# Patient Record
Sex: MALE | Race: ASIAN | NOT HISPANIC OR LATINO | Employment: FULL TIME | URBAN - METROPOLITAN AREA
[De-identification: names, ages, dates, MRNs, and addresses within clinical notes are randomized per-mention and may not be internally consistent; named-entity substitution may affect disease eponyms.]

---

## 2017-03-18 ENCOUNTER — SURGERY - HEALTHEAST (OUTPATIENT)
Dept: SURGERY | Facility: HOSPITAL | Age: 42
End: 2017-03-18

## 2017-03-18 ENCOUNTER — ANESTHESIA - HEALTHEAST (OUTPATIENT)
Dept: SURGERY | Facility: HOSPITAL | Age: 42
End: 2017-03-18

## 2017-03-18 ASSESSMENT — MIFFLIN-ST. JEOR
SCORE: 1407.43
SCORE: 1413.32

## 2017-03-27 ENCOUNTER — OFFICE VISIT (OUTPATIENT)
Dept: FAMILY MEDICINE | Facility: CLINIC | Age: 42
End: 2017-03-27

## 2017-03-27 VITALS
BODY MASS INDEX: 21.59 KG/M2 | TEMPERATURE: 97.9 F | DIASTOLIC BLOOD PRESSURE: 73 MMHG | WEIGHT: 129.6 LBS | OXYGEN SATURATION: 97 % | SYSTOLIC BLOOD PRESSURE: 113 MMHG | HEART RATE: 78 BPM | HEIGHT: 65 IN | RESPIRATION RATE: 16 BRPM

## 2017-03-27 DIAGNOSIS — L30.8 OTHER ECZEMA: ICD-10-CM

## 2017-03-27 DIAGNOSIS — R23.2 FACE GOES RED: Primary | ICD-10-CM

## 2017-03-27 DIAGNOSIS — K35.30 ACUTE APPENDICITIS WITH LOCALIZED PERITONITIS: ICD-10-CM

## 2017-03-27 LAB
% GRANULOCYTES: 66.4 %G (ref 40–75)
BUN SERPL-MCNC: 11 MG/DL (ref 5–24)
CALCIUM SERPL-MCNC: 9.1 MG/DL (ref 8.5–10.4)
CHLORIDE SERPLBLD-SCNC: 102 MMOL/L (ref 94–109)
CO2 SERPL-SCNC: 27 MMOL/L (ref 20–32)
CREAT SERPL-MCNC: 0.8 MG/DL (ref 0.8–1.5)
EGFR CALCULATED (BLACK REFERENCE): >90 ML/MIN
EGFR CALCULATED (NON BLACK REFERENCE): >90 ML/MIN
ERYTHROCYTE [SEDIMENTATION RATE] IN BLOOD: 27 MM/HR (ref 0–20)
GLUCOSE SERPL-MCNC: 163 MG/DL (ref 60–109)
GRANULOCYTES #: 5.5 K/UL (ref 1.6–8.3)
HCT VFR BLD AUTO: 48.5 % (ref 40–53)
HEMOGLOBIN: 15.5 G/DL (ref 13.3–17.7)
LYMPHOCYTES # BLD AUTO: 2.2 K/UL (ref 0.8–5.3)
LYMPHOCYTES NFR BLD AUTO: 26.2 %L (ref 20–48)
MCH RBC QN AUTO: 29.8 PG (ref 26.5–35)
MCHC RBC AUTO-ENTMCNC: 32 G/DL (ref 32–36)
MCV RBC AUTO: 93.2 FL (ref 78–100)
MID #: 0.6 K/UL (ref 0–2.2)
MID %: 7.4 %M (ref 0–20)
PLATELET # BLD AUTO: 252 K/UL (ref 150–450)
POTASSIUM SERPL-SCNC: 4 MMOL/L (ref 3.4–5.3)
RBC # BLD AUTO: 5.2 M/UL (ref 4.4–5.9)
SODIUM SERPL-SCNC: 140 MMOL/L (ref 133–144)
WBC # BLD AUTO: 8.3 K/UL (ref 4–11)

## 2017-03-27 RX ORDER — BENZOCAINE/MENTHOL 6 MG-10 MG
LOZENGE MUCOUS MEMBRANE
Qty: 30 G | Refills: 0 | Status: SHIPPED | OUTPATIENT
Start: 2017-03-27 | End: 2021-03-31

## 2017-03-27 NOTE — MR AVS SNAPSHOT
After Visit Summary   3/27/2017    Francisco Desir    MRN: [id number]           Patient Information     Date Of Birth          1975        Visit Information        Provider Department      3/27/2017 2:00 PM Bernabe Koch MD [facility]        Today's Diagnoses     Face goes red    -  1    Other eczema        Acute appendicitis with localized peritonitis          Care Instructions    Hydrocortisone cream - Apply once in the morning once at night.     Follow up in 2 weeks to recheck appendics and face.      Your medication list is printed, please keep this with you, it is helpful to bring this current list to any other medical appointments, the emergency room or hospital.    If you had lab testing today and your results are reassuring or normal they will be be mailed to you within 7 days.     If the lab tests need quick action we will call you with the results.   The phone number we will call with results is # [phone] (home) . If this is not the best number please call our clinic and change the number.    If you need any refills please call your pharmacy and they will contact us.    If you have any further concerns or wish to schedule another appointment you must call our office during normal business hours  [phone] (8-5:00 M-F)  If you have urgent medical questions that cannot wait  you may also call [phone] at any time of day.  If you have a medical emergency please call [phone].    Thank you for coming to [facility].          Follow-ups after your visit        Who to contact     Please call your clinic at [phone] to:    Ask questions about your health    Make or cancel appointments    Discuss your medicines    Learn about your test results    Speak to your doctor   If you have compliments or concerns about an experience at your clinic, or if you wish to file a complaint, please contact [facility] Patient Relations at [phone] or  "email us at [email or web address]         Additional Information About Your Visit        [employer] Information     [employer] is an electronic gateway that provides easy, online access to your medical records. With [employer], you can request a clinic appointment, read your test results, renew a prescription or communicate with your care team.     To sign up for [employer] visit the website at [email or web address]   You will be asked to enter the access code listed below, as well as some personal information. Please follow the directions to create your username and password.     Your access code is: [email or web address]  Expires: 2017  2:18 PM     Your access code will  in 90 days. If you need help or a new code, please contact your [facility] or call [phone] for assistance.        Care EveryWhere ID     This is your Care EveryWhere ID. This could be used by other organizations to access your [facility] medical records  [id number]        Your Vitals Were     Pulse Temperature Respirations Height Pulse Oximetry BMI (Body Mass Index)    78 97.9  F (36.6  C) (Oral) 16 5' 4.5\" (163.8 cm) 97% 21.9 kg/m2       Blood Pressure from Last 3 Encounters:   17 113/73    Weight from Last 3 Encounters:   17 129 lb 9.6 oz (58.8 kg)              We Performed the Following     Basic Metabolic Panel ([facility])  - Results < 1 hr     CBC w/ Diff and Plt ([facility])     Erythrocyte Sedimentation Rate ([facility])          Today's Medication Changes          These changes are accurate as of: 3/27/17  2:18 PM.  If you have any questions, ask your nurse or doctor.               Start taking these medicines.        Dose/Directions    hydrocortisone 1 % cream   Commonly known as:  CORTAID   Used for:  Other eczema   Started by:  Bernabe Koch MD        Apply sparingly to affected area twice per day as needed   Quantity:  30 g   Refills:  0            Where to get your medicines    "   These medications were sent to [facility] - [place], [place] - [address]  [address], [place] [address]     Phone:  [phone]     hydrocortisone 1 % cream                Primary Care Provider Office Phone # Fax #    Bernabe Koch [phone]       [facility] [address] [place] [address]        Thank you!     Thank you for choosing [facility]  for your care. Our goal is always to provide you with excellent care. Hearing back from our patients is one way we can continue to improve our services. Please take a few minutes to complete the written survey that you may receive in the mail after your visit with us. Thank you!             Your Updated Medication List - Protect others around you: Learn how to safely use, store and throw away your medicines at www.disposemymeds.org.          This list is accurate as of: 3/27/17  2:18 PM.  Always use your most recent med list.                   Brand Name Dispense Instructions for use    hydrocortisone 1 % cream    CORTAID    30 g    Apply sparingly to affected area twice per day as needed

## 2017-03-27 NOTE — PROGRESS NOTES
"       HPI:       Francisco Desir is a 41 year old  male new to our clinic who presents for  Follow up of appendicitis and new complaint of left cheek inflammation that occurs intermittently.    1. Patient released from hospital 7 days ago following lap appendectomy for appendicitis.  Has been doing well. Eating, no SOB.  Little further pain, and he does not require additional pain medication. Scheduled to see surgeon next week. Does not have a family physician.    2. Patient states that sa few years ago he had sunburn of his left cheek when fishing. Now get intermittent redness of the check that may be getting worse or large according to patient. No fever, no chills. This may be related to a similar trouble he has in the labial folds, and the scalp.               PMHX:   Current Medications:   No current outpatient prescriptions on file.       Existing Problems  There is no problem list on file for this patient.      Allergies:  No Known Allergies    Previous labs:  No results found for: WBC, HGB, HCT, PLT, CHOL, TRIG, HDL, ALT, AST, NA, CREATININE, BUN, CO2, TSH, PSA, INR, GLUF            Review of Systems:    CONSTITUTIONAL: no fatigue, no unexpected change in weight  SKIN:  no worrisome moles  EYES: no acute vision problems or changes  ENT: no ear problems, no mouth problems, no throat problems  RESP: no significant cough, no shortness of breath  CV: no chest pain, no palpitations, no new or worsening peripheral edema  GI: no nausea, no vomiting, no constipation, no diarrhea          Physical Exam:     Vitals:    03/27/17 1353   BP: 113/73   Pulse: 78   Resp: 16   Temp: 97.9  F (36.6  C)   TempSrc: Oral   SpO2: 97%   Weight: 129 lb 9.6 oz (58.8 kg)   Height: 5' 4.5\" (163.8 cm)     Body mass index is 21.9 kg/(m^2).    GENERAL:alert, well hydrated, no distress  EYES: Eyes grossly normal to inspection, extraocular movements - intact, and PERRL  HENT: ear canals- normal; TMs- normal; Nose- normal; Mouth- no ulcers, no " lesions Cheek: left cheek with mild erythema. Patient has been using Hmong traditional medicine on it, so difficult to distinguish this as a normal reaction or not.    NECK: no tenderness, no adenopathy, no asymmetry, no masses, no stiffness; thyroid- normal to palpation  RESP: lungs clear to auscultation - no rales, no rhonchi, no wheezes  CV: regular rates and rhythm, normal S1 S2, no S3 or S4 and no murmur, no click or rub -  ABDOMEN: soft, no tenderness, no  hepatosplenomegaly, recent lap appendectomy incisions.  No erythema.  No drainage.  no masses, normal bowel sounds             Labs and Procedures     No results found for any previous visit.           Assessment and Plan     1. S/p Acute appendicitis - released from hospital one week ago. Due to see surgeon next week. Has been doing well following surgery.  No further abdominal pain.  Wounds healing well without inflammation or erythema.   2.  Mild eczema on left cheek following old injury.  Persistent sensitivity to cold.  Sometimes it breaks out in rash according to patient.  Will initially check for metabolic reasons for flushing cheeks. For symptomatic support, will provide mild hydrocortisone.  Recheck in two weeks.     Options for treatment and follow-up care were reviewed with the patient and/or guardian. Francisco Desir and/or guardian engaged in the decision making process and verbalized understanding of the options discussed and agreed with the final plan.    Bernabe Koch MD

## 2017-03-27 NOTE — PATIENT INSTRUCTIONS
Hydrocortisone cream - Apply once in the morning once at night.     Follow up in 2 weeks to recheck appendics and face.      Your medication list is printed, please keep this with you, it is helpful to bring this current list to any other medical appointments, the emergency room or hospital.    If you had lab testing today and your results are reassuring or normal they will be be mailed to you within 7 days.     If the lab tests need quick action we will call you with the results.   The phone number we will call with results is # [phone] (home) . If this is not the best number please call our clinic and change the number.    If you need any refills please call your pharmacy and they will contact us.    If you have any further concerns or wish to schedule another appointment you must call our office during normal business hours  [phone] (8-5:00 M-F)  If you have urgent medical questions that cannot wait  you may also call [phone] at any time of day.  If you have a medical emergency please call [phone].    Thank you for coming to [facility].

## 2017-03-27 NOTE — NURSING NOTE
DUE FOR:  Lipid?  Mychart?  Tdap?    ROBBIN signed for [place]      name: Blanche Will  Language: Hmong  Agency: [employer]  Phone number: [phone]

## 2017-03-30 NOTE — PROGRESS NOTES
Please call patient and send results letter  The blood tests suggest that the appendicitis is doing well, however the blood sugar is higher than expected. This could be a reaction to the appendicitis, but would recommend scheduling another clinic visit for a recheck, and possible test to ensure that blood sugar is not causing a problem.

## 2017-04-03 ENCOUNTER — OFFICE VISIT - HEALTHEAST (OUTPATIENT)
Dept: SURGERY | Facility: CLINIC | Age: 42
End: 2017-04-03

## 2017-04-03 ENCOUNTER — COMMUNICATION - HEALTHEAST (OUTPATIENT)
Dept: SURGERY | Facility: CLINIC | Age: 42
End: 2017-04-03

## 2017-04-03 ENCOUNTER — OFFICE VISIT (OUTPATIENT)
Dept: FAMILY MEDICINE | Facility: CLINIC | Age: 42
End: 2017-04-03

## 2017-04-03 VITALS
DIASTOLIC BLOOD PRESSURE: 74 MMHG | RESPIRATION RATE: 18 BRPM | HEIGHT: 64 IN | BODY MASS INDEX: 22.57 KG/M2 | WEIGHT: 132.2 LBS | HEART RATE: 72 BPM | OXYGEN SATURATION: 100 % | TEMPERATURE: 98.2 F | SYSTOLIC BLOOD PRESSURE: 128 MMHG

## 2017-04-03 DIAGNOSIS — Z98.890 POST-OPERATIVE STATE: ICD-10-CM

## 2017-04-03 DIAGNOSIS — R23.2 FACIAL FLUSHING: Primary | ICD-10-CM

## 2017-04-03 DIAGNOSIS — R73.9 HYPERGLYCEMIA: ICD-10-CM

## 2017-04-03 LAB
CRP SERPL-MCNC: 0.2 MG/DL (ref 0–0.8)
HBA1C MFR BLD: 5.8 % (ref 4.1–5.7)
RHEUMATOID FACT SERPL-ACNC: <15 IU/ML (ref 0–30)

## 2017-04-03 NOTE — PROGRESS NOTES
"       HPI:       Francisco Desir is a 41 year old  [facility] male who presents for recheck of blood sugar.   Patient recently underwent acute appendectomy without complication.  Saw me for follow up.  Also had an initial complaint of facial flushing, mostly on the left.  This was the site of a previous sunburn obtained when fishing about two years ago.  Pt notices that it has been flushing, and is interested to know why.  Initial lab evaluation demonstrated elevated blood glucose to 160s.  Have asked patient back for evaluation and to check A1c.    No history of athropathy, or change in the urine.   Saw surgeon earlier today, who said he was doing well, but not able to go back to work yet.             PMHX:   Current Medications:   Current Outpatient Prescriptions   Medication Sig Dispense Refill     hydrocortisone (CORTAID) 1 % cream Apply sparingly to affected area twice per day as needed 30 g 0       Existing Problems  There is no problem list on file for this patient.      Allergies:  No Known Allergies    Previous labs:  Lab Results   Component Value Date    HGB 15.5 03/27/2017    HCT 48.5 03/27/2017    .0 03/27/2017    BUN 11.0 03/27/2017    CO2 27.0 03/27/2017               Review of Systems:    CONSTITUTIONAL: no fatigue, no unexpected change in weight  SKIN: no worrisome rashes, no worrisome moles, no worrisome lesions  EYES: no acute vision problems or changes  ENT: no ear problems, no mouth problems, no throat problems  RESP: no significant cough, no shortness of breath  CV: no chest pain, no palpitations, no new or worsening peripheral edema  GI: no nausea, no vomiting, no constipation, no diarrhea          Physical Exam:     Vitals:    04/03/17 1541   BP: 128/74   Pulse: 72   Resp: 18   Temp: 98.2  F (36.8  C)   TempSrc: Oral   SpO2: 100%   Weight: 132 lb 3.2 oz (60 kg)   Height: 5' 4.25\" (163.2 cm)     Body mass index is 22.52 kg/(m^2).    GENERAL:alert, well hydrated, no distress  EYES: Eyes grossly " normal to inspection, extraocular movements - intact, and PERRL  HENT:  Nose- normal; Mouth- no ulcers, no lesions. Possible mild erythema of the malar region of the left cheek.    NECK: no tenderness, no adenopathy, no asymmetry, no masses, no stiffness;   RESP: lungs clear to auscultation - no rales, no rhonchi, no wheezes  CV: regular rates and rhythm, normal S1 S2, no S3 or S4 and no murmur, no click or rub -               Labs and Procedures     Office Visit on 03/27/2017   Component Date Value Ref Range Status     Sed Rate 03/27/2017 27* 0 - 20 mm/hr Final     Glucose 03/27/2017 163.0* 60.0 - 109.0 mg/dL Final     Urea Nitrogen 03/27/2017 11.0  5.0 - 24.0 mg/dL Final     Creatinine 03/27/2017 0.8  0.8 - 1.5 mg/dL Final     Sodium 03/27/2017 140.0  133.0 - 144.0 mmol/L Final     Potassium 03/27/2017 4.0  3.4 - 5.3 mmol/L Final     Chloride 03/27/2017 102.0  94.0 - 109.0 mmol/L Final     Carbon Dioxide 03/27/2017 27.0  20.0 - 32.0 mmol/L Final     Calcium 03/27/2017 9.1  8.5 - 10.4 mg/dL Final     eGFR Calculated (Non Black Referen* 03/27/2017 >90  >60.0 mL/min Final     eGFR Calculated (Black Reference) 03/27/2017 >90  >60.0 mL/min Final     WBC 03/27/2017 8.3  4.0 - 11.0 K/uL Final     Lymphocytes # 03/27/2017 2.2  0.8 - 5.3 K/uL Final     % Lymphocytes 03/27/2017 26.2  20.0 - 48.0 %L Final     Mid # 03/27/2017 0.6  0.0 - 2.2 K/uL Final     Mid % 03/27/2017 7.4  0.0 - 20.0 %M Final     GRANULOCYTES # 03/27/2017 5.5  1.6 - 8.3 K/uL Final     % Granulocytes 03/27/2017 66.4  40.0 - 75.0 %G Final     RBC 03/27/2017 5.20  4.40 - 5.90 M/uL Final     Hemoglobin 03/27/2017 15.5  13.3 - 17.7 g/dL Final     Hematocrit 03/27/2017 48.5  40.0 - 53.0 % Final     MCV 03/27/2017 93.2  78.0 - 100.0 fL Final     MCH 03/27/2017 29.8  26.5 - 35.0 pg Final     MCHC 03/27/2017 32.0  32.0 - 36.0 g/dL Final     Platelets 03/27/2017 252.0  150.0 - 450.0 K/uL Final              Assessment and Plan     1.Will check A1c in view of  hyperglycemia.    2. Mildly elevated sed rate, will check RON, CRP, and RF.  Recheck inone week.       Options for treatment and follow-up care were reviewed with the patient and/or guardian. Francisco Desir and/or guardian engaged in the decision making process and verbalized understanding of the options discussed and agreed with the final plan.    Bernabe Koch MD

## 2017-04-03 NOTE — MR AVS SNAPSHOT
After Visit Summary   4/3/2017    Francisco Desir    MRN: [id number]           Patient Information     Date Of Birth          1975        Visit Information        Provider Department      4/3/2017 3:20 PM Bernabe Koch MD [facility]        Today's Diagnoses     Facial flushing    -  1    Hyperglycemia          Care Instructions    ~Keep appt on 4/10/17, we will see you for follow up then    Your medication list is printed, please keep this with you, it is helpful to bring this current list to any other medical appointments, the emergency room or hospital.    If you had lab testing today and your results are reassuring or normal they will be be mailed to you within 7 days.     If the lab tests need quick action we will call you with the results.   The phone number we will call with results is # [phone] (home) . If this is not the best number please call our clinic and change the number.    If you need any refills please call your pharmacy and they will contact us.    If you have any further concerns or wish to schedule another appointment you must call our office during normal business hours  [phone] (8-5:00 M-F)  If you have urgent medical questions that cannot wait  you may also call [phone] at any time of day.  If you have a medical emergency please call [phone].    Thank you for coming to [facility].          Follow-ups after your visit        Your next 10 appointments already scheduled     Apr 10, 2017  3:00 PM CDT   Return Visit with Bernabe Koch MD   [facility] ([facility])    [address] [place] [id number]   [phone]              Who to contact     Please call your clinic at [phone] to:    Ask questions about your health    Make or cancel appointments    Discuss your medicines    Learn about your test results    Speak to your doctor   If you have compliments or concerns about an experience at your clinic, or if  "you wish to file a complaint, please contact [facility] Patient Relations at [phone] or email us at [email or web address]         Additional Information About Your Visit        [employer] Information     [employer] is an electronic gateway that provides easy, online access to your medical records. With [employer], you can request a clinic appointment, read your test results, renew a prescription or communicate with your care team.     To sign up for [employer] visit the website at [email or web address]   You will be asked to enter the access code listed below, as well as some personal information. Please follow the directions to create your username and password.     Your access code is: [email or web address]  Expires: 2017  2:18 PM     Your access code will  in 90 days. If you need help or a new code, please contact your [facility] or call [phone] for assistance.        Care EveryWhere ID     This is your Care EveryWhere ID. This could be used by other organizations to access your [place] medical records  [id number]        Your Vitals Were     Pulse Temperature Respirations Height Pulse Oximetry BMI (Body Mass Index)    72 98.2  F (36.8  C) (Oral) 18 5' 4.25\" (163.2 cm) 100% 22.52 kg/m2       Blood Pressure from Last 3 Encounters:   17 128/74   17 113/73    Weight from Last 3 Encounters:   17 132 lb 3.2 oz (60 kg)   17 129 lb 9.6 oz (58.8 kg)              We Performed the Following     Antinuclear Ab [place] ([employer])     C-Reactive Protein ([facility])     Hemoglobin A1c ([facility])     Rheumatoid Factor Quant ([facility])        Primary Care Provider Office Phone # Fax #    Bernabe Koch [phone]       [facility] [address] [place] [address]        Thank you!     Thank you for choosing [facility]  for your care. Our goal is always to provide you with " excellent care. Hearing back from our patients is one way we can continue to improve our services. Please take a few minutes to complete the written survey that you may receive in the mail after your visit with us. Thank you!             Your Updated Medication List - Protect others around you: Learn how to safely use, store and throw away your medicines at www.disposemymeds.org.          This list is accurate as of: 4/3/17  3:56 PM.  Always use your most recent med list.                   Brand Name Dispense Instructions for use    hydrocortisone 1 % cream    CORTAID    30 g    Apply sparingly to affected area twice per day as needed

## 2017-04-03 NOTE — PATIENT INSTRUCTIONS
~Keep appt on 4/10/17, we will see you for follow up then    Your medication list is printed, please keep this with you, it is helpful to bring this current list to any other medical appointments, the emergency room or hospital.    If you had lab testing today and your results are reassuring or normal they will be be mailed to you within 7 days.     If the lab tests need quick action we will call you with the results.   The phone number we will call with results is # [phone] (home) . If this is not the best number please call our clinic and change the number.    If you need any refills please call your pharmacy and they will contact us.    If you have any further concerns or wish to schedule another appointment you must call our office during normal business hours  [phone] (8-5:00 M-F)  If you have urgent medical questions that cannot wait  you may also call [phone] at any time of day.  If you have a medical emergency please call [phone].    Thank you for coming to [facility].

## 2017-04-04 LAB — ANA SER QL: 0.5 U

## 2017-04-05 LAB
PHOSPHOLIPID AB IGG, S: <9.4 GPL
PHOSPHOLIPID AB IGM, S: <9.4 MPL

## 2017-04-06 ENCOUNTER — COMMUNICATION - HEALTHEAST (OUTPATIENT)
Dept: SURGERY | Facility: CLINIC | Age: 42
End: 2017-04-06

## 2017-04-10 ENCOUNTER — OFFICE VISIT (OUTPATIENT)
Dept: FAMILY MEDICINE | Facility: CLINIC | Age: 42
End: 2017-04-10

## 2017-04-10 VITALS
TEMPERATURE: 97.8 F | BODY MASS INDEX: 23.18 KG/M2 | DIASTOLIC BLOOD PRESSURE: 68 MMHG | WEIGHT: 135.8 LBS | HEIGHT: 64 IN | HEART RATE: 76 BPM | SYSTOLIC BLOOD PRESSURE: 107 MMHG | OXYGEN SATURATION: 97 % | RESPIRATION RATE: 18 BRPM

## 2017-04-10 DIAGNOSIS — H10.45 CHRONIC ALLERGIC CONJUNCTIVITIS: ICD-10-CM

## 2017-04-10 DIAGNOSIS — L53.9 ERYTHEMA OF FACE: Primary | ICD-10-CM

## 2017-04-10 DIAGNOSIS — R23.2 FACIAL FLUSHING: ICD-10-CM

## 2017-04-10 DIAGNOSIS — Z23 IMMUNIZATION DUE: ICD-10-CM

## 2017-04-10 RX ORDER — AMOXICILLIN 250 MG
1 CAPSULE ORAL
COMMUNITY
Start: 2017-03-19 | End: 2021-08-30

## 2017-04-10 NOTE — PROGRESS NOTES
"       HPI:       Francisco Desir is a 41 year old  male who presents for Recheck of facial flushing and eye irritation.   Patient states that the 1% hydrocortisone tried on the face burned and made things worse. Discussed evaluation of possible metabolic causes of flucing.  Mildly elevated Sed rate,   RON, CRP, Phospholipid Ab, and RA factor all negative.  No clear evidence of metabolic etiology.    Mild erythema of the left cheek - slightly worse than last week.               PMHX:   Current Medications:   Current Outpatient Prescriptions   Medication Sig Dispense Refill     senna-docusate (SENOKOT-S;PERICOLACE) 8.6-50 MG per tablet Take 1 tablet by mouth       hydrocortisone (CORTAID) 1 % cream Apply sparingly to affected area twice per day as needed (Patient not taking: Reported on 4/10/2017) 30 g 0       Existing Problems  There is no problem list on file for this patient.      Allergies:  No Known Allergies    Previous labs:  Lab Results   Component Value Date    HGB 15.5 03/27/2017    HCT 48.5 03/27/2017    .0 03/27/2017    BUN 11.0 03/27/2017    CO2 27.0 03/27/2017               Review of Systems:    CONSTITUTIONAL: no fatigue, no unexpected change in weight  SKIN: no worrisome rashes, no worrisome moles, no worrisome lesions  EYES: no acute vision problems or changes  ENT: no ear problems, no mouth problems, no throat problems  RESP: no significant cough, no shortness of breath  CV: no chest pain, no palpitations, no new or worsening peripheral edema  GI: no nausea, no vomiting, no constipation, no diarrhea          Physical Exam:     Vitals:    04/10/17 1452   BP: 107/68   Pulse: 76   Resp: 18   Temp: 97.8  F (36.6  C)   TempSrc: Oral   SpO2: 97%   Weight: 135 lb 12.8 oz (61.6 kg)   Height: 5' 4.25\" (163.2 cm)     Body mass index is 23.13 kg/(m^2).    GENERAL:alert, well hydrated, no distress  EYES: Eyes grossly normal to inspection, extraocular movements - intact, and PERRL. Pterygium forming on left " sclera. Mild bilateral irritation and injectioen of conjunctiva bilaterally, left worse than the right.     HENT: ear canals- normal; TMs- normal; Nose- normal; Mouth- no ulcers, no lesions  NECK: no tenderness, no adenopathy, no asymmetry, no masses, no stiffness; thyroid- normal to palpation  RESP: lungs clear to auscultation - no rales, no rhonchi, no wheezes  CV: regular rates and rhythm, normal S1 S2, no S3 or S4 and no murmur, no click or rub.             Labs and Procedures     Office Visit on 04/03/2017   Component Date Value Ref Range Status     Hemoglobin A1C 04/03/2017 5.8* 4.1 - 5.7 % Final     C-Reactive Protein 04/03/2017 0.2  0.0 - 0.8 mg/dL Final     RON Screen [place] 04/03/2017 0.5  <=2.9 U Final     RA,Quantitative 04/03/2017 <15.0  0 - 30 IU/mL Final     Phospholipid Ab IgM, S 04/03/2017 <9.4  <15.0 (Negative) MPL Final     Phospholipid Ab IgG, S 04/03/2017 <9.4  <15.0 (Negative) GPL Final    Comment:    Test Performed by:  [facility] [place] [address]                Assessment and Plan     1.Alergic conjunctivitis - with mild to moderate scleral irritation.  Will initially treat with claritin.  Advance to opthalmologic eye drops if necessary.    2. Left malar erythema t sight of old injury.  Unclear etiology.  Will refer to dermatology for evaluation and treatment.    3. Return in two to three weeks.         Options for treatment and follow-up care were reviewed with the patient and/or guardian. Francisco Desir and/or guardian engaged in the decision making process and verbalized understanding of the options discussed and agreed with the final plan.    Bernabe Koch MD

## 2017-04-10 NOTE — PATIENT INSTRUCTIONS
- Start taking loratadine-pseudoePHEDrine (Claritin D-24 Hour)  mg daily.   - You can purchase this medication over the counter. This medication may not be covered by insurance.  - Take medication in the morning as it may keep you awake if you take at night.    Your medication list is printed, please keep this with you, it is helpful to bring this current list to any other medical appointments, the emergency room or hospital.    If you had lab testing today and your results are reassuring or normal they will be be mailed to you within 7 days.     If the lab tests need quick action we will call you with the results.   The phone number we will call with results is # [phone] (home) . If this is not the best number please call our clinic and change the number.    If you need any refills please call your pharmacy and they will contact us.    If you have any further concerns or wish to schedule another appointment you must call our office during normal business hours  [phone] (8-5:00 M-F)  If you have urgent medical questions that cannot wait  you may also call [phone] at any time of day.  If you have a medical emergency please call [phone].    Thank you for coming to [facility].    Erythema  Erythema means a reddening of the skin. If the condition is just in one area of your body, it can mean that you have inflammation, irritation, or infection of the skin. Erythema over a joint can be a sign of joint infection. When erythema is spread over most of your body, like a rash, it is usually a sign of a more general problem. This could be an allergic reaction, viral or bacterial infection, or an immune system disease.  The cause of your condition is not clear. It may be hard to diagnose the exact cause of an illness in its early stages. More time may be needed before doctors can make a diagnosis.  Home care  Follow these guidelines when caring for yourself at home:    Watch for any new symptoms.  Tell your health care provider about any that show up.    You may use acetaminophen or ibuprofen to control pain, unless another medicine was prescribed. If you have chronic liver or kidney disease, talk with your provider before using these medicines. Also talk with your provider if you ve had a stomach ulcer or GI bleeding. Don t give aspirin to anyone under 18 years of age who is ill with a fever.    Have anyone who touches your skin wash his or her hands with soap and water.    Don t share towels or clothes.    Keep the affected area clean and dry. Raising the affected area above the level of your heart may help ease swelling.  Follow-up care  Follow up with your health care provider, or as advised.  When to seek medical advice  Call your health care provider right away  if any of these occur:    Pain or redness that gets worse    Fluid or pus drains from the reddened area    New joint pain    New rash    Fever of 100.4 F (38 C) or higher, or as directed by your health care provider    Severe headache, neck pain, drowsiness, or confusion    Weakness, dizziness, repeated vomiting, or diarrhea     0133-0115 [employer]. [address] [place] [address]. All rights reserved. This information is not intended as a substitute for professional medical care. Always follow your healthcare professional's instructions.      Referral for ( TEST )  :      Dermatology  LOCATION/PLACE/Provider :    [facility]  [place] [address]  DATE & TIME :  4-12-17 at 10:20     PHONE :     [phone]  FAX :     [phone]  ADDITIONAL INFORMATION :     NA  Appointment made by clinic staff/:    [facility]

## 2017-04-10 NOTE — NURSING NOTE
Tdap-given     name: Blanche Will  Language: Hmong  Agency: [facility]  Phone number: [phone]

## 2017-04-10 NOTE — MR AVS SNAPSHOT
After Visit Summary   4/10/2017    Francisco Desir    MRN: [id number]           Patient Information     Date Of Birth          1975        Visit Information        Provider Department      4/10/2017 3:00 PM Bernabe Koch MD [facility]        Today's Diagnoses     Immunization due    -  1    Facial flushing        Erythema of face        Chronic allergic conjunctivitis          Care Instructions    - Start taking loratadine-pseudoePHEDrine (Claritin D-24 Hour)  mg daily.   - You can purchase this medication over the counter. This medication may not be covered by insurance.  - Take medication in the morning as it may keep you awake if you take at night.    Your medication list is printed, please keep this with you, it is helpful to bring this current list to any other medical appointments, the emergency room or hospital.    If you had lab testing today and your results are reassuring or normal they will be be mailed to you within 7 days.     If the lab tests need quick action we will call you with the results.   The phone number we will call with results is # [phone] (home) . If this is not the best number please call our clinic and change the number.    If you need any refills please call your pharmacy and they will contact us.    If you have any further concerns or wish to schedule another appointment you must call our office during normal business hours  [phone] (8-5:00 M-F)  If you have urgent medical questions that cannot wait  you may also call [phone] at any time of day.  If you have a medical emergency please call [phone].    Thank you for coming to [facility].    Erythema  Erythema means a reddening of the skin. If the condition is just in one area of your body, it can mean that you have inflammation, irritation, or infection of the skin. Erythema over a joint can be a sign of joint infection. When erythema is spread over most of your body, like a  rash, it is usually a sign of a more general problem. This could be an allergic reaction, viral or bacterial infection, or an immune system disease.  The cause of your condition is not clear. It may be hard to diagnose the exact cause of an illness in its early stages. More time may be needed before doctors can make a diagnosis.  Home care  Follow these guidelines when caring for yourself at home:    Watch for any new symptoms. Tell your health care provider about any that show up.    You may use acetaminophen or ibuprofen to control pain, unless another medicine was prescribed. If you have chronic liver or kidney disease, talk with your provider before using these medicines. Also talk with your provider if you ve had a stomach ulcer or GI bleeding. Don t give aspirin to anyone under 18 years of age who is ill with a fever.    Have anyone who touches your skin wash his or her hands with soap and water.    Don t share towels or clothes.    Keep the affected area clean and dry. Raising the affected area above the level of your heart may help ease swelling.  Follow-up care  Follow up with your health care provider, or as advised.  When to seek medical advice  Call your health care provider right away  if any of these occur:    Pain or redness that gets worse    Fluid or pus drains from the reddened area    New joint pain    New rash    Fever of 100.4 F (38 C) or higher, or as directed by your health care provider    Severe headache, neck pain, drowsiness, or confusion    Weakness, dizziness, repeated vomiting, or diarrhea     0439-8885 [employer]. [address] [place] [address], [place] [address]. All rights reserved. This information is not intended as a substitute for professional medical care. Always follow your healthcare professional's instructions.              Follow-ups after your visit        Additional Services     DERMATOLOGY REFERRAL       Reason for Referral: erythema of face, left  "cheek.     needed: Yes  Language: Hmong    May leave message on voicemail: Yes    ([facility] Only) Referral should be tracked (Yes/No)?                  Future tests that were ordered for you today     Open Future Orders        Priority Expected Expires Ordered    DERMATOLOGY REFERRAL Routine  4/10/2018 4/10/2017            Who to contact     Please call your clinic at [phone] to:    Ask questions about your health    Make or cancel appointments    Discuss your medicines    Learn about your test results    Speak to your doctor   If you have compliments or concerns about an experience at your clinic, or if you wish to file a complaint, please contact [facility] Patient Relations at [phone] or email us at [email or web address]         Additional Information About Your Visit        CareTreeharKuratur Information     Yovigo is an electronic gateway that provides easy, online access to your medical records. With Yovigo, you can request a clinic appointment, read your test results, renew a prescription or communicate with your care team.     To sign up for Yovigo visit the website at [email or web address]   You will be asked to enter the access code listed below, as well as some personal information. Please follow the directions to create your username and password.     Your access code is: [email or web address]  Expires: 2017  2:18 PM     Your access code will  in 90 days. If you need help or a new code, please contact your [facility] or call [phone] for assistance.        Care EveryWhere ID     This is your Care EveryWhere ID. This could be used by other organizations to access your [place] medical records  [id number]        Your Vitals Were     Pulse Temperature Respirations Height Pulse Oximetry BMI (Body Mass Index)    76 97.8  F (36.6  C) (Oral) 18 5' 4.25\" (163.2 cm) 97% 23.13 kg/m2       Blood Pressure from Last 3 " Encounters:   04/10/17 107/68   04/03/17 128/74   03/27/17 113/73    Weight from Last 3 Encounters:   04/10/17 135 lb 12.8 oz (61.6 kg)   04/03/17 132 lb 3.2 oz (60 kg)   03/27/17 129 lb 9.6 oz (58.8 kg)              We Performed the Following     ADMIN VACCINE, INITIAL     TDAP VACCINE (BOOSTRIX)          Today's Medication Changes          These changes are accurate as of: 4/10/17  3:29 PM.  If you have any questions, ask your nurse or doctor.               Start taking these medicines.        Dose/Directions    loratadine-pseudoePHEDrine  MG per 24 hr tablet   Commonly known as:  CLARITIN-D 24-hour   Used for:  Chronic allergic conjunctivitis   Started by:  Bernabe Koch MD        Dose:  1 tablet   Take 1 tablet by mouth daily   Quantity:  14 tablet   Refills:  0            Where to get your medicines      Some of these will need a paper prescription and others can be bought over the counter.  Ask your nurse if you have questions.     Bring a paper prescription for each of these medications     loratadine-pseudoePHEDrine  MG per 24 hr tablet                Primary Care Provider Office Phone # Fax #    Bernabe Koch [phone]       [facility] [address] [place] [address]        Thank you!     Thank you for choosing [facility]  for your care. Our goal is always to provide you with excellent care. Hearing back from our patients is one way we can continue to improve our services. Please take a few minutes to complete the written survey that you may receive in the mail after your visit with us. Thank you!             Your Updated Medication List - Protect others around you: Learn how to safely use, store and throw away your medicines at www.disposemymeds.org.          This list is accurate as of: 4/10/17  3:29 PM.  Always use your most recent med list.                   Brand Name Dispense Instructions for use    hydrocortisone 1 % cream     CORTAID    30 g    Apply sparingly to affected area twice per day as needed       loratadine-pseudoePHEDrine  MG per 24 hr tablet    CLARITIN-D 24-hour    14 tablet    Take 1 tablet by mouth daily       senna-docusate 8.6-50 MG per tablet    SENOKOT-S;PERICOLACE     Take 1 tablet by mouth

## 2017-05-03 ENCOUNTER — OFFICE VISIT (OUTPATIENT)
Dept: FAMILY MEDICINE | Facility: CLINIC | Age: 42
End: 2017-05-03

## 2017-05-03 VITALS
BODY MASS INDEX: 22.23 KG/M2 | DIASTOLIC BLOOD PRESSURE: 80 MMHG | HEART RATE: 78 BPM | WEIGHT: 133.4 LBS | HEIGHT: 65 IN | TEMPERATURE: 97.9 F | SYSTOLIC BLOOD PRESSURE: 127 MMHG | OXYGEN SATURATION: 96 %

## 2017-05-03 DIAGNOSIS — J06.9 VIRAL URI: ICD-10-CM

## 2017-05-03 DIAGNOSIS — R05.9 COUGH: Primary | ICD-10-CM

## 2017-05-03 LAB
FLUAV AG UPPER RESP QL IA.RAPID: NEGATIVE
FLUBV AG UPPER RESP QL IA.RAPID: NEGATIVE

## 2017-05-03 RX ORDER — GUAIFENESIN/DEXTROMETHORPHAN 100-10MG/5
5 SYRUP ORAL 3 TIMES DAILY PRN
Qty: 560 ML | Refills: 0 | Status: SHIPPED | OUTPATIENT
Start: 2017-05-03 | End: 2017-05-18

## 2017-05-03 NOTE — PROGRESS NOTES
"       HPI:       Francisco Desir is a 42 year old male presenting for:    1. Cough, sore throat  - started on evening of Monday Monday 5/1   - cough:   - non-productive   - mildly worse at night, present intermittently through the day  - endorsing chest congestion , clear rhinorrhea, and mild myalgias   - no fevers, chills, night sweats  - no shortness of breath  - no watery eyes   - no n/v/d  - no sick contacts at home  - normally gest the flu shot every year, however no flu shot this year  - cough is worse at night, but still present through the day as well  - no smoking, no asthma, no copd         Physical Exam:     Vitals:    05/03/17 1608   BP: 127/80   BP Location: Right arm   Patient Position: Chair   Cuff Size: Adult Regular   Pulse: 78   Temp: 97.9  F (36.6  C)   TempSrc: Oral   SpO2: 96%   Weight: 133 lb 6.4 oz (60.5 kg)   Height: 5' 4.75\" (164.5 cm)     Body mass index is 22.37 kg/(m^2).    Vitals reviewed  General: No acute distress  Ears: canals patent, TM within normal limits  Eyes: EOMI, PERRLA  Nose: nasal mucosa moist, clear rhinorrhea  Oral cavity: moist mucosa, no tonsillar exudates, no oropharyngeal erythema/swelling  Neck: good ROM, supple, no apparent tracheal deviation  Respiratory: CTA bilaterally, no wheezes/rhonchi/rhales appreciated, no respiratory distress  Chest wall: No chest wall tenderness  Abdomen: soft, non-distended, non-tender, normoactive bowel sounds  Extremities: no cyanosis, no edema, capillary refill <2 seconds, well perfused      Assessment and Plan   #VIral URI  - influenza A/B negative  - tylenol/ibuprofen PRN pain/fever (no history of bleeding disorder and/or GI bleeding/ulcer per patient)  - robitussin PRN  - if symptoms do not improve in 1 week, return to clinic     Options for treatment and follow-up care were reviewed with the patient and/or guardian. Francisco Desir and/or guardian engaged in the decision making process and verbalized understanding of the options " discussed and agreed with the final plan.    Misbah Palla, MD      Precepted today with: Ana Galo MD

## 2017-05-03 NOTE — MR AVS SNAPSHOT
"              After Visit Summary   5/3/2017    Francisco Desir    MRN: [id number]           Patient Information     Date Of Birth          1975        Visit Information        Provider Department      5/3/2017 4:00 PM Palla, Misbah, MD [facility]        Today's Diagnoses     Cough    -  1    Viral URI           Follow-ups after your visit        Who to contact     Please call your clinic at [phone] to:    Ask questions about your health    Make or cancel appointments    Discuss your medicines    Learn about your test results    Speak to your doctor   If you have compliments or concerns about an experience at your clinic, or if you wish to file a complaint, please contact [facility] Patient Relations at [phone] or email us at [email or web address]         Additional Information About Your Visit        MyChart Information     Ganipara is an electronic gateway that provides easy, online access to your medical records. With Ganipara, you can request a clinic appointment, read your test results, renew a prescription or communicate with your care team.     To sign up for [employer] visit the website at [email or web address]   You will be asked to enter the access code listed below, as well as some personal information. Please follow the directions to create your username and password.     Your access code is: [email or web address]  Expires: 2017  2:18 PM     Your access code will  in 90 days. If you need help or a new code, please contact your [facility] or call [phone] for assistance.        Care EveryWhere ID     This is your Care EveryWhere ID. This could be used by other organizations to access your [place] medical records  [id number]        Your Vitals Were     Pulse Temperature Height Pulse Oximetry BMI (Body Mass Index)       78 97.9  F (36.6  C) (Oral) 5' 4.75\" (164.5 cm) 96% 22.37 kg/m2        Blood Pressure " from Last 3 Encounters:   05/03/17 127/80   04/10/17 107/68   04/03/17 128/74    Weight from Last 3 Encounters:   05/03/17 133 lb 6.4 oz (60.5 kg)   04/10/17 135 lb 12.8 oz (61.6 kg)   04/03/17 132 lb 3.2 oz (60 kg)              We Performed the Following     Influenza A/B Antigen (P FM)          Today's Medication Changes          These changes are accurate as of: 5/3/17 11:59 PM.  If you have any questions, ask your nurse or doctor.               Start taking these medicines.        Dose/Directions    guaiFENesin-dextromethorphan 100-10 MG/5ML syrup   Commonly known as:  ROBITUSSIN DM   Used for:  Cough, Viral URI   Started by:  Palla, Misbah, MD        Dose:  5 mL   Take 5 mLs by mouth 3 times daily as needed for cough   Quantity:  560 mL   Refills:  0            Where to get your medicines      These medications were sent to [facility] - [place], [place] - [address] [place] [address] [address]kw[address]  [address], [place] [address]     Phone:  [phone]     guaiFENesin-dextromethorphan 100-10 MG/5ML syrup                Primary Care Provider Office Phone # Fax #    Bernabe Koch [phone]       [facility] [address] [place] [address]        Thank you!     Thank you for choosing [facility]  for your care. Our goal is always to provide you with excellent care. Hearing back from our patients is one way we can continue to improve our services. Please take a few minutes to complete the written survey that you may receive in the mail after your visit with us. Thank you!             Your Updated Medication List - Protect others around you: Learn how to safely use, store and throw away your medicines at www.disposemymeds.org.          This list is accurate as of: 5/3/17 11:59 PM.  Always use your most recent med list.                   Brand Name Dispense Instructions for use    guaiFENesin-dextromethorphan 100-10 MG/5ML syrup    ROBITUSSIN DM    560 mL     Take 5 mLs by mouth 3 times daily as needed for cough       hydrocortisone 1 % cream    CORTAID    30 g    Apply sparingly to affected area twice per day as needed       loratadine-pseudoePHEDrine  MG per 24 hr tablet    CLARITIN-D 24-hour    14 tablet    Take 1 tablet by mouth daily       senna-docusate 8.6-50 MG per tablet    SENOKOT-S;PERICOLACE     Take 1 tablet by mouth Reported on 5/3/2017

## 2017-05-18 ENCOUNTER — OFFICE VISIT (OUTPATIENT)
Dept: FAMILY MEDICINE | Facility: CLINIC | Age: 42
End: 2017-05-18

## 2017-05-18 VITALS
DIASTOLIC BLOOD PRESSURE: 72 MMHG | WEIGHT: 133.8 LBS | HEIGHT: 64 IN | RESPIRATION RATE: 20 BRPM | TEMPERATURE: 98.5 F | OXYGEN SATURATION: 99 % | BODY MASS INDEX: 22.84 KG/M2 | HEART RATE: 78 BPM | SYSTOLIC BLOOD PRESSURE: 117 MMHG

## 2017-05-18 DIAGNOSIS — J06.9 VIRAL URI WITH COUGH: ICD-10-CM

## 2017-05-18 DIAGNOSIS — R05.9 COUGH: Primary | ICD-10-CM

## 2017-05-18 RX ORDER — CODEINE PHOSPHATE AND GUAIFENESIN 10; 100 MG/5ML; MG/5ML
2 SOLUTION ORAL
Qty: 240 ML | Refills: 0 | Status: SHIPPED | OUTPATIENT
Start: 2017-05-18 | End: 2021-03-31

## 2017-05-18 NOTE — PROGRESS NOTES
Preceptor Attestation:  Patient's case reviewed and discussed with Misbah Palla, MD.  Patient seen and discussed with the resident.  I agree with assessment and plan of care.  Supervising Physician:  Ana Galo MD  [facility]

## 2017-05-18 NOTE — MR AVS SNAPSHOT
"              After Visit Summary   2017    Francisco Desir    MRN: [id number]           Patient Information     Date Of Birth          1975        Visit Information        Provider Department      2017 3:40 PM Budd, Jennifer, DO [facility]        Today's Diagnoses     Cough    -  1    Chronic allergic conjunctivitis           Follow-ups after your visit        Who to contact     Please call your clinic at [phone] to:    Ask questions about your health    Make or cancel appointments    Discuss your medicines    Learn about your test results    Speak to your doctor   If you have compliments or concerns about an experience at your clinic, or if you wish to file a complaint, please contact [facility] Patient Relations at [phone] or email us at [email or web address]         Additional Information About Your Visit        MyChart Information     DECA is an electronic gateway that provides easy, online access to your medical records. With DECA, you can request a clinic appointment, read your test results, renew a prescription or communicate with your care team.     To sign up for [employer] Cent[employer] visit the website at [email or web address]   You will be asked to enter the access code listed below, as well as some personal information. Please follow the directions to create your username and password.     Your access code is: [email or web address]  Expires: 2017  2:18 PM     Your access code will  in 90 days. If you need help or a new code, please contact your [facility] or call [phone] for assistance.        Care EveryWhere ID     This is your Care EveryWhere ID. This could be used by other organizations to access your [place] medical records  [id number]        Your Vitals Were     Pulse Temperature Respirations Height Pulse Oximetry BMI (Body Mass Index)    78 98.5  F (36.9  C) (Oral) 20 5' 3.5\" (161.3 cm) 99% " 23.33 kg/m2       Blood Pressure from Last 3 Encounters:   05/18/17 117/72   05/03/17 127/80   04/10/17 107/68    Weight from Last 3 Encounters:   05/18/17 133 lb 12.8 oz (60.7 kg)   05/03/17 133 lb 6.4 oz (60.5 kg)   04/10/17 135 lb 12.8 oz (61.6 kg)              Today, you had the following     No orders found for display         Today's Medication Changes          These changes are accurate as of: 5/18/17  4:28 PM.  If you have any questions, ask your nurse or doctor.               Start taking these medicines.        Dose/Directions    guaiFENesin-codeine 100-10 MG/5ML Soln solution   Commonly known as:  ROBITUSSIN AC   Used for:  Cough   Started by:  Nicole Roth DO        Dose:  2 tsp.   Take 10 mLs by mouth nightly as needed for cough   Quantity:  240 mL   Refills:  0         Stop taking these medicines if you haven't already. Please contact your care team if you have questions.     guaiFENesin-dextromethorphan 100-10 MG/5ML syrup   Commonly known as:  ROBITUSSIN DM   Stopped by:  Nicole Roth DO                Where to get your medicines      Some of these will need a paper prescription and others can be bought over the counter.  Ask your nurse if you have questions.     Bring a paper prescription for each of these medications     guaiFENesin-codeine 100-10 MG/5ML Soln solution    loratadine-pseudoePHEDrine  MG per 24 hr tablet                Primary Care Provider Office Phone # Fax #    Bernabe Koch [phone]       [facility] [address] [place] [address]        Thank you!     Thank you for choosing [facility]  for your care. Our goal is always to provide you with excellent care. Hearing back from our patients is one way we can continue to improve our services. Please take a few minutes to complete the written survey that you may receive in the mail after your visit with us. Thank you!             Your Updated Medication List - Protect  others around you: Learn how to safely use, store and throw away your medicines at www.disposemymeds.org.          This list is accurate as of: 5/18/17  4:28 PM.  Always use your most recent med list.                   Brand Name Dispense Instructions for use    guaiFENesin-codeine 100-10 MG/5ML Soln solution    ROBITUSSIN AC    240 mL    Take 10 mLs by mouth nightly as needed for cough       hydrocortisone 1 % cream    CORTAID    30 g    Apply sparingly to affected area twice per day as needed       loratadine-pseudoePHEDrine  MG per 24 hr tablet    CLARITIN-D 24-hour    14 tablet    Take 1 tablet by mouth daily       senna-docusate 8.6-50 MG per tablet    SENOKOT-S;PERICOLACE     Take 1 tablet by mouth Reported on 5/3/2017

## 2018-12-05 ENCOUNTER — TRANSFERRED RECORDS (OUTPATIENT)
Dept: HEALTH INFORMATION MANAGEMENT | Facility: CLINIC | Age: 43
End: 2018-12-05

## 2020-07-31 ENCOUNTER — OFFICE VISIT (OUTPATIENT)
Dept: FAMILY MEDICINE | Facility: CLINIC | Age: 45
End: 2020-07-31
Payer: COMMERCIAL

## 2020-07-31 VITALS
OXYGEN SATURATION: 96 % | SYSTOLIC BLOOD PRESSURE: 122 MMHG | WEIGHT: 141.2 LBS | BODY MASS INDEX: 25.02 KG/M2 | HEIGHT: 63 IN | DIASTOLIC BLOOD PRESSURE: 84 MMHG | HEART RATE: 87 BPM | TEMPERATURE: 98.3 F

## 2020-07-31 DIAGNOSIS — K21.9 GASTROESOPHAGEAL REFLUX DISEASE WITHOUT ESOPHAGITIS: Primary | ICD-10-CM

## 2020-07-31 PROBLEM — K76.0 HEPATIC STEATOSIS: Status: ACTIVE | Noted: 2017-03-19

## 2020-07-31 PROBLEM — R33.9 RETENTION OF URINE: Status: ACTIVE | Noted: 2017-03-19

## 2020-07-31 PROBLEM — K35.30 ACUTE APPENDICITIS WITH LOCALIZED PERITONITIS: Status: ACTIVE | Noted: 2020-07-31

## 2020-07-31 PROBLEM — Z90.49 S/P APPENDECTOMY: Status: ACTIVE | Noted: 2020-07-31

## 2020-07-31 ASSESSMENT — MIFFLIN-ST. JEOR: SCORE: 1415.61

## 2020-07-31 NOTE — NURSING NOTE
Due to patient being non-English speaking/uses sign language, an  was used for this visit. Only for face-to-face interpretation by an external agency, date and length of interpretation can be found on the scanned worksheet.     name: aMddie  Agency: [employer]  Language: Anant   Telephone number: [phone]  Type of interpretation: Telephone, spoken

## 2020-07-31 NOTE — LETTER
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  He may work in other parts of the facility.    Sincerely,    Umair Chopra MD

## 2020-07-31 NOTE — PROGRESS NOTES
Assessment and Plan   (K21.9) Gastroesophageal reflux disease without esophagitis  (primary encounter diagnosis)  Comment: Patient's symptoms sound most consistent with acid reflux.  Recommended reducing hot pepper consumption and will try 1 month of omeprazole and follow up afterward.  Plan: omeprazole (PRILOSEC) 20 MG DR capsule    It sounds like patient may also have osteoarthritis.  Can follow this up at future appointments if it worsens or begins to cause him significant discomfort.  Pain is mostly in the DIP and PIP joints.    Follow up in 1 Month(s).    Options for treatment and follow-up care were reviewed with the patient and/or guardian. Francisco Desir and/or guardian engaged in the decision making process and verbalized understanding of the options discussed and agreed with the final plan.    Abdirizak Chopra MD  [facility], PGY-2    Precepted patient with Dr. Aury Norris       HPI:   Francisco Desir is a 45 year old male who presents to clinic today for   Chief Complaint   Patient presents with     [facility]     establish care     Abdominal Pain     stomach pain, warm feeling with/ with out food     Pt states he is having a warm sensation in his stomach.  It is also burning.  It is there regardless of whether he eats food or not.  In March he went to urgent care and they gave him a medicine which helps.  He eats hot peppers but those bring the symptoms back.  The pain does not go up into his chest or down his arms.  There is no associated shortness of breath.  The pain is always in his epigastric region.  He never throws up or feels nauseated.  He never has diarrhea except for when he eats hot peppers.  He never has bloody stools.      He does not take any medications and has no other health problems he knows of except for a painful lower back with carrying heavy items.  He needs to wrap candy at work and when it is  really cold it hurts the joints in his fingers.  His fingers sometimes are painful and stiff in the morning and improve with soaking in a warm sink.  They usually get better throughout the day.  Takes Tylenol for this.  Sometimes takes NSAIDs such as ibuprofen but not commonly anymore.    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  He feels dizzy and uncomfortable around these odors and fears that he may fall and injure himself.  There are areas he could work which do not contain these odors, specifically areas away from where they cook chocolate.      Pt states he does not use tobacco.  He drinks alcohol only on special occasions, maybe once per week.  Maybe about 5 cans of beer at that time.  He states he has never tried other drugs.    A [employer]  was used for this visit         Review of Systems:     10 point ROS negative except as noted in HPI.         PMHX:   Active Problems List  Patient Active Problem List   Diagnosis     Acute appendicitis with localized peritonitis     Hepatic steatosis     Retention of urine     S/P appendectomy     Active problem list reviewed and updated.    Current Medications  Current Outpatient Medications   Medication Sig Dispense Refill     omeprazole (PRILOSEC) 20 MG DR capsule Take 1 capsule (20 mg) by mouth daily 30 capsule 0     guaiFENesin-codeine (ROBITUSSIN AC) 100-10 MG/5ML SOLN solution Take 10 mLs by mouth nightly as needed for cough (Patient not taking: Reported on 7/31/2020) 240 mL 0     hydrocortisone (CORTAID) 1 % cream Apply sparingly to affected area twice per day as needed (Patient not taking: Reported on 4/10/2017) 30 g 0     loratadine-pseudoePHEDrine (CLARITIN-D 24-HOUR)  MG per 24 hr tablet Take 1 tablet by mouth daily (Patient not taking: Reported on 7/31/2020) 14 tablet 0     senna-docusate (SENOKOT-S;PERICOLACE) 8.6-50 MG per  "tablet Take 1 tablet by mouth Reported on 5/3/2017       Medication list reviewed and updated.    Social History  Social History     Tobacco Use     Smoking status: Never Smoker     Smokeless tobacco: Never Used   Substance Use Topics     Alcohol use: Yes     Comment: on occasions only.     Drug use: No     History   Drug Use No       Family History  Family History   Family history unknown: Yes       Allergies  No Known Allergies         Physical Exam:     Vitals:    07/31/20 1517   BP: 122/84   Pulse: 87   Temp: 98.3  F (36.8  C)   TempSrc: Oral   SpO2: 96%   Weight: 64 kg (141 lb 3.2 oz)   Height: 1.6 m (5' 3\")     Body mass index is 25.01 kg/m .    GENERAL APPEARANCE: alert, appears stated age, no acute distress  HEENT: Eyes grossly normal to inspection, nares normal, and mouth and throat without erythema, ulcers, or lesions  RESP: lungs clear to auscultation - no rales, rhonchi, or wheezes  CV: regular rate and rhythm, no murmurs  ABDOMEN: soft, nontender   MSK: extremities normal, no gross deformities noted, no lower extremity edema  SKIN: no suspicious lesions or rashes   NEURO: Normal strength and tone, sensory exam grossly normal, mentation appears intact and speech normal  PSYCH: mood and affect appropriate  "

## 2020-08-02 NOTE — PROGRESS NOTES
Preceptor Attestation:  Patient's case reviewed and discussed with Umair Chopra MD resident and I evaluated the patient. I agree with written assessment and plan of care.  Supervising Physician:  MARCELLE HOSKINS MD  [facility]

## 2021-03-31 ENCOUNTER — OFFICE VISIT (OUTPATIENT)
Dept: FAMILY MEDICINE | Facility: CLINIC | Age: 46
End: 2021-03-31
Payer: COMMERCIAL

## 2021-03-31 VITALS
OXYGEN SATURATION: 97 % | HEART RATE: 87 BPM | DIASTOLIC BLOOD PRESSURE: 90 MMHG | RESPIRATION RATE: 18 BRPM | SYSTOLIC BLOOD PRESSURE: 137 MMHG

## 2021-03-31 DIAGNOSIS — Z13.9 SCREENING FOR CONDITION: ICD-10-CM

## 2021-03-31 DIAGNOSIS — R35.0 FREQUENT URINATION: ICD-10-CM

## 2021-03-31 DIAGNOSIS — K21.9 GASTROESOPHAGEAL REFLUX DISEASE WITHOUT ESOPHAGITIS: Primary | ICD-10-CM

## 2021-03-31 LAB
ALBUMIN SERPL BCP-MCNC: 3.7 G/DL (ref 3.5–5)
ALP SERPL-CCNC: 84 U/L (ref 45–120)
ALT SERPL W/O P-5'-P-CCNC: 58 U/L (ref 0–45)
ANION GAP SERPL CALCULATED.3IONS-SCNC: 11 MMOL/L (ref 5–18)
AST SERPL-CCNC: 30 U/L (ref 0–40)
BILIRUB SERPL-MCNC: 0.6 MG/DL (ref 0–1)
BILIRUBIN UR: NEGATIVE MG/DL
BLOOD UR: NEGATIVE MG/DL
BUN SERPL-MCNC: 11 MG/DL (ref 8–22)
CALCIUM SERPL-MCNC: 9.3 MG/DL (ref 8.5–10.5)
CHLORIDE SERPL-SCNC: 100 MMOL/L (ref 98–107)
CHOLEST SERPL-MCNC: 294 MG/DL
CO2 SERPL-SCNC: 26 MMOL/L (ref 22–31)
CREAT SERPL-MCNC: 0.81 MG/DL (ref 0.7–1.3)
FASTING?: NO
GLUCOSE SERPL-MCNC: 215 MG/DL (ref 70–125)
GLUCOSE URINE: ABNORMAL
HBA1C MFR BLD: 6.2 % (ref 4.1–5.7)
HDLC SERPL-MCNC: 44 MG/DL
HIV 1+2 AB+HIV1 P24 AG SERPL QL IA: NEGATIVE
KETONES UR QL: NEGATIVE MG/DL
LDLC SERPL CALC-MCNC: 195 MG/DL
LEUKOCYTE ESTERASE UR: NEGATIVE
NITRITE UR QL STRIP: NEGATIVE MG/DL
PH UR STRIP: 5.5 [PH] (ref 4.5–8)
POTASSIUM SERPL-SCNC: 3.5 MMOL/L (ref 3.5–5)
PROT SERPL-MCNC: 7.3 G/DL (ref 6–8)
PROTEIN UR: NEGATIVE MG/DL
SODIUM SERPL-SCNC: 137 MMOL/L (ref 136–145)
SP GR UR STRIP: <=1.005 (ref 1–1.03)
TRIGL SERPL-MCNC: 276 MG/DL
UROBILINOGEN UR STRIP-ACNC: ABNORMAL E.U./DL

## 2021-03-31 PROCEDURE — 36415 COLL VENOUS BLD VENIPUNCTURE: CPT | Performed by: STUDENT IN AN ORGANIZED HEALTH CARE EDUCATION/TRAINING PROGRAM

## 2021-03-31 PROCEDURE — 83036 HEMOGLOBIN GLYCOSYLATED A1C: CPT | Performed by: STUDENT IN AN ORGANIZED HEALTH CARE EDUCATION/TRAINING PROGRAM

## 2021-03-31 PROCEDURE — 81003 URINALYSIS AUTO W/O SCOPE: CPT | Performed by: STUDENT IN AN ORGANIZED HEALTH CARE EDUCATION/TRAINING PROGRAM

## 2021-03-31 PROCEDURE — 99214 OFFICE O/P EST MOD 30 MIN: CPT | Mod: GC | Performed by: STUDENT IN AN ORGANIZED HEALTH CARE EDUCATION/TRAINING PROGRAM

## 2021-03-31 RX ORDER — FAMOTIDINE 20 MG/1
20 TABLET, FILM COATED ORAL 2 TIMES DAILY
Qty: 60 TABLET | Refills: 0 | Status: SHIPPED | OUTPATIENT
Start: 2021-03-31 | End: 2021-08-30

## 2021-03-31 NOTE — LETTER
April 9, 2021      Francisco Desir  [address] [place] [address]        Dear ,    We are writing to inform you of your test results.    Your lab work has returned.  It was mostly normal, but it does show that you have prediabetes and high cholesterol.  We should talk about these at our next office visit as I think you may need to take medications to prevent them from causing worse problems for you in the future.  Please feel free to call with any questions or concerns.    Resulted Orders   Hemoglobin A1c ([facility])   Result Value Ref Range    Hemoglobin A1C 6.2 (H) 4.1 - 5.7 %   HIV Ag/Ab Screen [place] ([facility])   Result Value Ref Range    HIV Antigen/Antibody Negative Negative    Narrative    Test performed by:  [facility] [address] [place] [address]  Method is Abbott HIV Ag/Ab for the detection of HIV p24 antigen, HIV-1   antibodies and HIV-2 antibodies.   Hepatitis C Antibody ([facility])   Result Value Ref Range    Hepatitis C Antibody Screen Negative Negative    Narrative    Test performed by:  [facility] [address] [place] [address]   Comprehensive Metabolic ([facility]) - Results > 1 hr   Result Value Ref Range    Sodium 137 136 - 145 mmol/L    Potassium 3.5 3.5 - 5.0 mmol/L    Chloride 100 98 - 107 mmol/L    CO2, Total 26 22 - 31 mmol/L    Anion Gap 11 5 - 18 mmol/L    Glucose 215 (H) 70 - 125 mg/dL    Urea Nitrogen 11 8 - 22 mg/dL    Creatinine 0.81 0.70 - 1.30 mg/dL    GFR Estimate If Black >60 >60 mL/min/1.73m2    GFR Estimate >60 >60 mL/min/1.73m2    Bilirubin Total 0.6 0.0 - 1.0 mg/dL    Calcium 9.3 8.5 - 10.5 mg/dL    Protein Total 7.3 6.0 - 8.0 g/dL    Albumin 3.7 3.5 - 5.0 g/dL    Alkaline Phosphatase 84 45 - 120 U/L    AST (SGOT) 30 0 - 40 U/L    ALT (SGPT) 58 (H) 0 - 45 U/L    Narrative    Test performed by:  [facility] [address] [place]  [address]  Fasting Glucose reference range is 70-99 mg/dL per  American Diabetes Association (ADA) guidelines.   Lipid [place] ([facility]) - Results > 1 hr   Result Value Ref Range    Cholesterol 294 (H) <=199 mg/dL    Triglycerides 276 (H) <=149 mg/dL    HDL Cholesterol 44 >=40 mg/dL    LDL Cholesterol Calculated 195 (H) <=129 mg/dL    Fasting? No     Narrative    Test performed by:  [facility]  [address] [place] [address]   Urinalysis ([facility])   Result Value Ref Range    Specific Gravity Urine <=1.005 1.005 - 1.030    pH Urine 5.5 4.5 - 8.0    Leukocyte Esterase UR Negative NEGATIVE    Nitrite Urine Negative NEGATIVE mg/dL    Protein UR Negative NEGATIVE mg/dL    Glucose Urine 1+ (A) NEGATIVE    Ketones Urine Negative NEGATIVE mg/dL    Urobilinogen mg/dL 0.2 E.U./dL 0.2 E.U./dL E.U./dL    Bilirubin UR Negative NEGATIVE mg/dL    Blood UR Negative NEGATIVE mg/dL       If you have any questions or concerns, please call the clinic at the number listed above.       Sincerely,      Yohan Ely MD

## 2021-03-31 NOTE — PROGRESS NOTES
Assessment and Plan   (K21.9) Gastroesophageal reflux disease without esophagitis  (primary encounter diagnosis)  Comment: Patient's abdominal pain sounds most consistent with his previously documented GERD.  Recommended reducing spicy foods and citrus and will try Pepcid.  Plan: famotidine (PEPCID) 20 MG tablet          (R35.0) Frequent urination  Comment: There is some concern that this could be onset of DM, as he has a history of prediabetes.  Will check appropriate lab work and follow-up in 1-2 weeks.  Plan: Hemoglobin A1c (P ), Comprehensive         Metabolic ([facility]) - Results > 1 hr,         Urinalysis ([facility])          (Z13.9) Screening for condition  Comment: Routine lab work.  Plan: HIV Ag/Ab Screen [place] ([facility]),         Hepatitis C Antibody ([facility]), Lipid         [place] ([facility]) - Results > 1 hr          Follow up in 2 Week(s).    Options for treatment and follow-up care were reviewed with the patient and/or guardian. Francisco Desir and/or guardian engaged in the decision making process and verbalized understanding of the options discussed and agreed with the final plan.    Abdirizak Chopra MD  [facility], PGY-2    Precepted patient with Dr. Yohan Ely III       HPI:   Francisco Desir is a 45 year old male who presents to clinic today for   Chief Complaint   Patient presents with     Incontinence     Three months     Erectile Dysfunction     Dizziness     Abdominal Pain     Patient is here with three issues.  He has been having issues with his stomach with burning whether or not he eats.    He also has been having frequent urination and erectile dysfunction.  This all for the last 7 months, worse in the last 3.  Since pandemic started he has gained a bit of weight.  Has never had this before.  No unusual sexual partners.  No pain with urination, just frequency and mild burning.  Urinary frequency and  burning only for the last 3 months.      The medication we tried last time did not help at all.  It was omeprazole.    He has not tried any medications except for pepto bismol which seems to help for awhile.  The pain is worse before eating but also does not improve after eating.  He has noticed that spicy foods and lemon make it worse.  Drinks coffee only occasionally.      A [employer]  was used for this visit.         Review of Systems:     10 point ROS negative except as noted in HPI.         PMHX:   Active Problems List  Patient Active Problem List   Diagnosis     Acute appendicitis with localized peritonitis     Hepatic steatosis     Retention of urine     S/P appendectomy     Active problem list reviewed and updated.    Current Medications  Current Outpatient Medications   Medication Sig Dispense Refill     famotidine (PEPCID) 20 MG tablet Take 1 tablet (20 mg) by mouth 2 times daily 60 tablet 0     senna-docusate (SENOKOT-S;PERICOLACE) 8.6-50 MG per tablet Take 1 tablet by mouth Reported on 5/3/2017       Medication list reviewed and updated.    Social History  Social History     Tobacco Use     Smoking status: Never Smoker     Smokeless tobacco: Never Used   Substance Use Topics     Alcohol use: Yes     Comment: on occasions only.     Drug use: No     History   Drug Use No       Family History  Family History   Family history unknown: Yes       Allergies  No Known Allergies         Physical Exam:     Vitals:    03/31/21 1544 03/31/21 1547   BP: (!) 141/92 (!) 137/90   Pulse: 87    Resp: 18    SpO2: 97%      There is no height or weight on file to calculate BMI.    GENERAL APPEARANCE: alert, appears stated age, no acute distress  HEENT: Eyes grossly normal to inspection, nares normal, MMM.  RESP: lungs clear to auscultation - no rales, rhonchi, or wheezes  CV: regular rate and rhythm, no murmurs  ABDOMEN: soft, nontender   MSK: extremities normal, no gross deformities noted, no lower extremity  edema  SKIN: no suspicious lesions or rashes   NEURO: Normal strength and tone, sensory exam grossly normal, mentation appears intact and speech normal  PSYCH: mood and affect appropriate

## 2021-04-01 LAB — HCV AB SER QL: NEGATIVE

## 2021-04-01 NOTE — PROGRESS NOTES
Preceptor Attestation:   Patient seen, evaluated and discussed with the resident. I have verified the content of the note, which accurately reflects my assessment of the patient and the plan of care.    Supervising Physician:Yohan Ely MD    [facility]

## 2021-04-13 ENCOUNTER — OFFICE VISIT (OUTPATIENT)
Dept: FAMILY MEDICINE | Facility: CLINIC | Age: 46
End: 2021-04-13
Payer: COMMERCIAL

## 2021-04-13 ENCOUNTER — AMBULATORY - HEALTHEAST (OUTPATIENT)
Dept: SCHEDULING | Facility: CLINIC | Age: 46
End: 2021-04-13

## 2021-04-13 VITALS
RESPIRATION RATE: 18 BRPM | DIASTOLIC BLOOD PRESSURE: 82 MMHG | OXYGEN SATURATION: 97 % | SYSTOLIC BLOOD PRESSURE: 123 MMHG | HEART RATE: 97 BPM

## 2021-04-13 DIAGNOSIS — R73.03 PREDIABETES: ICD-10-CM

## 2021-04-13 DIAGNOSIS — K21.9 GASTROESOPHAGEAL REFLUX DISEASE WITHOUT ESOPHAGITIS: ICD-10-CM

## 2021-04-13 DIAGNOSIS — E78.00 HYPERCHOLESTEREMIA: ICD-10-CM

## 2021-04-13 DIAGNOSIS — E78.00 HYPERCHOLESTEREMIA: Primary | ICD-10-CM

## 2021-04-13 PROCEDURE — 99214 OFFICE O/P EST MOD 30 MIN: CPT | Mod: GC | Performed by: STUDENT IN AN ORGANIZED HEALTH CARE EDUCATION/TRAINING PROGRAM

## 2021-04-13 RX ORDER — ATORVASTATIN CALCIUM 40 MG/1
40 TABLET, FILM COATED ORAL DAILY
Qty: 90 TABLET | Refills: 1 | Status: SHIPPED | OUTPATIENT
Start: 2021-04-13 | End: 2021-08-30 | Stop reason: SINTOL

## 2021-04-13 NOTE — PROGRESS NOTES
Assessment and Plan   (E78.00) Hypercholesteremia  (primary encounter diagnosis)  Comment: Patient's cholesterol is high enough that he qualifies for a high-intensity statin so will start atorvastatin 40 mg daily.  Repeat lipid panel at next visit in 3 months and if tolerating the current dose will move him up to 80 mg daily.  Also recommended lifestyle changes.  Plan: atorvastatin (LIPITOR) 40 MG tablet, NUTRITION         REFERRAL          (R73.03) Prediabetes  Comment: Will attempt lifestyle changes initially, follow-up A1C in 3 months.  If still elevated in prediabetic range, will start low-dose metformin.  Plan: NUTRITION REFERRAL          Follow up in 2 Week(s).    Options for treatment and follow-up care were reviewed with the patient and/or guardian. Francisco Desir and/or guardian engaged in the decision making process and verbalized understanding of the options discussed and agreed with the final plan.    Abdirizak Chopra MD  [facility], PGY-2    Precepted patient with Dr. Audelia Del Angel       HPI:   Francisco Desir is a 46 year old male who presents to clinic today for   Chief Complaint   Patient presents with     Results     Patient states his stomach is feeling a lot better now that he has the medicine.      His lipids are elevated and A1C 6.2.  He eats a lot of rice but not a lot of sugar.    Wondering about lab work results.    A [employer]  was used for this visit.         Review of Systems:     10 point ROS negative except as noted in HPI.         PMHX:   Active Problems List  Patient Active Problem List   Diagnosis     Acute appendicitis with localized peritonitis     Hepatic steatosis     Retention of urine     S/P appendectomy     Active problem list reviewed and updated.    Current Medications  Current Outpatient Medications   Medication Sig Dispense Refill     atorvastatin (LIPITOR) 40 MG tablet Take 1 tablet (40 mg)  by mouth daily 90 tablet 1     famotidine (PEPCID) 20 MG tablet Take 1 tablet (20 mg) by mouth 2 times daily 60 tablet 0     senna-docusate (SENOKOT-S;PERICOLACE) 8.6-50 MG per tablet Take 1 tablet by mouth Reported on 5/3/2017       Medication list reviewed and updated.    Social History  Social History     Tobacco Use     Smoking status: Never Smoker     Smokeless tobacco: Never Used   Substance Use Topics     Alcohol use: Yes     Comment: on occasions only.     Drug use: No     History   Drug Use No       Family History  Family History   Family history unknown: Yes       Allergies  No Known Allergies         Physical Exam:     Vitals:    04/13/21 1518 04/13/21 1522   BP: (!) 131/96 123/82   Pulse: 97    Resp: 18    SpO2: 97%      There is no height or weight on file to calculate BMI.    GENERAL APPEARANCE: alert, appears stated age, no acute distress  HEENT: Eyes grossly normal to inspection, nares normal, MMM.  RESP: lungs clear to auscultation - no rales, rhonchi, or wheezes  CV: regular rate and rhythm, no murmurs  ABDOMEN: Nondistended   MSK: extremities normal, no gross deformities noted, no lower extremity edema  SKIN: no suspicious lesions or rashes   NEURO: Normal strength and tone, sensory exam grossly normal, mentation appears intact and speech normal  PSYCH: mood and affect appropriate

## 2021-04-13 NOTE — NURSING NOTE
name: Aan Bragg  Language: Hmong  Agency: [facility]  Phone number: [phone]

## 2021-04-13 NOTE — PATIENT INSTRUCTIONS
Referral for :     Nutrition    LOCATION/PLACE/Provider :    [facility]   DATE & TIME :    Faxed referral, they will reach out.   PHONE :     [phone]  FAX :    [phone]  Appointment made by clinic staff/:    Corine

## 2021-04-14 NOTE — PROGRESS NOTES
Preceptor Attestation:  Patient's case reviewed and discussed with the resident, Umair Chopra MD, and I personally evaluated the patient. I agree with written assessment and plan of care.    Supervising Physician:  Audelia Del Angel MD   [facility]

## 2021-05-30 VITALS — WEIGHT: 133.7 LBS | BODY MASS INDEX: 21.49 KG/M2 | HEIGHT: 66 IN

## 2021-06-09 NOTE — ANESTHESIA POSTPROCEDURE EVALUATION
Patient: Francisco Desir  APPENDECTOMY, LAPAROSCOPIC  Anesthesia type: general    Patient location: PACU  Last vitals:   Vitals:    03/18/17 1259   BP: 117/81   Pulse: 90   Resp: 16   Temp: 37.1  C (98.7  F)   SpO2: 96%     Post vital signs: stable  Level of consciousness: awake and responds to simple questions  Post-anesthesia pain: pain controlled  Post-anesthesia nausea and vomiting: no  Pulmonary: unassisted, return to baseline  Cardiovascular: stable and blood pressure at baseline  Hydration: adequate  Anesthetic events: no    QCDR Measures:  ASA# 11 - Yohana-op Cardiac Arrest: ASA11B - Patient did NOT experience unanticipated cardiac arrest  ASA# 12 - Yohana-op Mortality Rate: ASA12B - Patient did NOT die  ASA# 13 - PACU Re-Intubation Rate: ASA13B - Patient did NOT require a new airway mgmt  ASA# 10 - Composite Anes Safety: ASA10A - No serious adverse event  ASA# 38 - New Corneal Injury: ASA38A - No new exposure keratitis or corneal abrasion in PACU    Additional Notes:

## 2021-06-09 NOTE — ANESTHESIA PREPROCEDURE EVALUATION
Anesthesia Evaluation        Airway   Mallampati: II  Neck ROM: full   Pulmonary - negative ROS and normal exam                          Cardiovascular - negative ROS and normal exam   Neuro/Psych - negative ROS     Endo/Other - negative ROS      GI/Hepatic/Renal - negative ROS           Dental                         Anesthesia Plan  Planned anesthetic: general endotracheal    ASA 2 - emergent     Anesthetic plan and risks discussed with: patient  Anesthesia plan special considerations: rapid sequence induction,   Post-op plan: routine recovery

## 2021-06-09 NOTE — ANESTHESIA CARE TRANSFER NOTE
Last vitals:   Vitals:    03/18/17 0930   BP: 125/74   Pulse: (!) 109   Resp: 16   Temp: 36.5  C (97.7  F)   SpO2: 100%     Patient's level of consciousness is drowsy  Spontaneous respirations: yes  Maintains airway independently: yes  Dentition unchanged: yes  Oropharynx: oropharynx clear of all foreign objects    QCDR Measures:  ASA# 20 - Surgical Safety Checklist: ASA20A - Safety Checks Done  PQRS# 430 - Adult PONV Prevention: 4558F - Pt received => 2 anti-emetic agents (different classes) preop & intraop  ASA# 8 - Peds PONV Prevention: NA - Not pediatric patient, not GA or 2 or more risk factors NOT present  PQRS# 424 - Yohana-op Temp Management: 4559F - At least one body temp DOCUMENTED => 35.5C or 95.9F within required timeframe  PQRS# 426 - PACU Transfer Protocol: - Transfer of care checklist used  ASA# 14 - Acute Post-op Pain: ASA14B - Patient did NOT experience pain >= 7 out of 10    I completed my SBAR handoff to the receiving nurse per policy and procedure.

## 2021-06-09 NOTE — PROGRESS NOTES
HPI: Pt is here for follow up of a lap appy.   he is doing well.  Pain is well controlled, not needing any pain medication however still has some tenderness.  No difficulties with the surgical wound/wounds.  he is eating well and denies fever and chills.         /78 (Patient Site: Left Arm, Patient Position: Sitting, Cuff Size: Adult Regular)  Pulse 77  SpO2 99%    EXAM:  GENERAL:Appears well  ABDOMEN:  Soft, +BS  SURGICAL WOUNDS:  Incisions healing well, no enduration or drainage.      Assessment/Plan: . Doing well after surgery and should follow up as needed.          Geo Kurtz, [facility] Department of Surgery

## 2021-08-30 ENCOUNTER — OFFICE VISIT (OUTPATIENT)
Dept: FAMILY MEDICINE | Facility: CLINIC | Age: 46
End: 2021-08-30
Payer: COMMERCIAL

## 2021-08-30 VITALS
BODY MASS INDEX: 24.08 KG/M2 | DIASTOLIC BLOOD PRESSURE: 72 MMHG | HEIGHT: 64 IN | WEIGHT: 141.05 LBS | HEART RATE: 92 BPM | OXYGEN SATURATION: 98 % | TEMPERATURE: 98.4 F | SYSTOLIC BLOOD PRESSURE: 113 MMHG

## 2021-08-30 DIAGNOSIS — G89.29 CHRONIC MIDLINE LOW BACK PAIN WITHOUT SCIATICA: ICD-10-CM

## 2021-08-30 DIAGNOSIS — M25.562 ACUTE PAIN OF LEFT KNEE: ICD-10-CM

## 2021-08-30 DIAGNOSIS — R73.03 PREDIABETES: Primary | ICD-10-CM

## 2021-08-30 DIAGNOSIS — K21.9 GASTROESOPHAGEAL REFLUX DISEASE WITHOUT ESOPHAGITIS: ICD-10-CM

## 2021-08-30 DIAGNOSIS — M54.50 CHRONIC MIDLINE LOW BACK PAIN WITHOUT SCIATICA: ICD-10-CM

## 2021-08-30 DIAGNOSIS — E78.00 HYPERCHOLESTEREMIA: ICD-10-CM

## 2021-08-30 LAB
CHOLEST SERPL-MCNC: 255 MG/DL
FASTING STATUS PATIENT QL REPORTED: NO
HBA1C MFR BLD: 5.8 % (ref 0–5.6)
HDLC SERPL-MCNC: 42 MG/DL
LDLC SERPL CALC-MCNC: 155 MG/DL
TRIGL SERPL-MCNC: 291 MG/DL

## 2021-08-30 PROCEDURE — 99214 OFFICE O/P EST MOD 30 MIN: CPT | Mod: GC | Performed by: STUDENT IN AN ORGANIZED HEALTH CARE EDUCATION/TRAINING PROGRAM

## 2021-08-30 PROCEDURE — 36415 COLL VENOUS BLD VENIPUNCTURE: CPT | Performed by: STUDENT IN AN ORGANIZED HEALTH CARE EDUCATION/TRAINING PROGRAM

## 2021-08-30 PROCEDURE — 80061 LIPID PANEL: CPT | Performed by: STUDENT IN AN ORGANIZED HEALTH CARE EDUCATION/TRAINING PROGRAM

## 2021-08-30 PROCEDURE — 83036 HEMOGLOBIN GLYCOSYLATED A1C: CPT | Performed by: STUDENT IN AN ORGANIZED HEALTH CARE EDUCATION/TRAINING PROGRAM

## 2021-08-30 RX ORDER — ROSUVASTATIN CALCIUM 10 MG/1
10 TABLET, COATED ORAL DAILY
Qty: 30 TABLET | Refills: 0 | Status: SHIPPED | OUTPATIENT
Start: 2021-08-30 | End: 2021-10-12

## 2021-08-30 RX ORDER — FAMOTIDINE 20 MG/1
20 TABLET, FILM COATED ORAL 2 TIMES DAILY
Qty: 120 TABLET | Refills: 1 | Status: SHIPPED | OUTPATIENT
Start: 2021-08-30 | End: 2021-10-12

## 2021-08-30 ASSESSMENT — MIFFLIN-ST. JEOR: SCORE: 1428.55

## 2021-08-30 NOTE — PROGRESS NOTES
Assessment and Plan   (R73.03) Prediabetes  (primary encounter diagnosis)  Comment: A1C improving with lifestyle changes.  Encouraged him to keep up the good work.  No indication to start medication today.  Plan: Hemoglobin A1c          (E78.00) Hypercholesteremia  Comment: Elevated cholesterol, will try rosuvastatin instead of atorvastatin as he has had tingling in his fingers every time he has taken atorvastatin.  Plan: Lipid Profile, rosuvastatin (CRESTOR) 10 MG         tablet          (K21.9) Gastroesophageal reflux disease without esophagitis  Comment: Continues to have GERD symptoms.  Will check H. Pylori and prescribe famotidine.  Plan: Helicobacter pylori Antigen Stool, famotidine         (PEPCID) 20 MG tablet          (M54.5,  G89.29) Chronic midline low back pain without sciatica  Comment: PT and L-spine XR for chronic back pain, XR for baseline.  Plan: XR Lumbar Spine 2/3 Views, Physical Therapy         Referral, CANCELED: Physical Therapy Referral          (M25.162) Acute pain of left knee  Comment: Possible patellofemoral pain syndrome.  Recommended exercises and follow-up in 1 month and will also have him see PT for this.  Will check XR in case of osteoarthritis.  Plan: XR Knee Left 1/2 Views          Follow up in 4 Week(s).    Options for treatment and follow-up care were reviewed with the patient and/or guardian. Francisco Desir and/or guardian engaged in the decision making process and verbalized understanding of the options discussed and agreed with the final plan.    Abdirizak Chopra MD  [facility], PGY-3    Precepted patient with Dr. Royce Shultz       HPI:   Francisco Desir is a 46 year old male who presents to clinic today for   Chief Complaint   Patient presents with     RECHECK     burning stomach     Knee Pain     left knee burning     Back Pain     lower right     Patient has been having back pain.  Used to do lots of  heavy work and carrying.  Pain is mostly in middle and R side.  When he carries something heavy he notices the pain and with bending.  Has to lay down for 15 minutes when he gets home each day to ease the pain.  Hoping to have some treatment started.  Pain has been bothering him since 2011 when he had to  a basket and noticed it the first time when he picked up a basket.  Never gets pain shooting down the backs of his legs but does get it into his R lower buttocks.  When the pain is really bad takes Tylenol or Advil, which help.    His stomach pain persists.  He was treated with a medication for a month and thinks the physician he saw found a bacteria in his stomach to treat, but does not remember the details.      He has also noticed a burning sensation in the sitting position in his L knee and a burning sensation.  No injury.  Also notices it with going down the stairs.      He has been working on portion control, walking, and eating brown rice instead of white rice.      A [employer]  was used for this visit.         Review of Systems:     10 point ROS negative except as noted in HPI.         PMHX:   Active Problems List  Patient Active Problem List   Diagnosis     Acute appendicitis with localized peritonitis     Hepatic steatosis     Retention of urine     S/P appendectomy     Gastroesophageal reflux disease without esophagitis     Active problem list reviewed and updated.    Current Medications  Current Outpatient Medications   Medication Sig Dispense Refill     famotidine (PEPCID) 20 MG tablet Take 1 tablet (20 mg) by mouth 2 times daily 120 tablet 1     rosuvastatin (CRESTOR) 10 MG tablet Take 1 tablet (10 mg) by mouth daily 30 tablet 0     Medication list reviewed and updated.    Social History  Social History     Tobacco Use     Smoking status: Never Smoker     Smokeless tobacco: Never Used   Vaping Use     Vaping Use: Never used   Substance Use Topics     Alcohol use: Yes     Comment: on  "occasions only.     Drug use: No     History   Drug Use No       Family History  Family History   Family history unknown: Yes       Allergies  No Known Allergies         Physical Exam:     Vitals:    08/30/21 1548   BP: 113/72   BP Location: Right arm   Patient Position: Sitting   Cuff Size: Adult Regular   Pulse: 92   Temp: 98.4  F (36.9  C)   SpO2: 98%   Weight: 64 kg (141 lb 0.8 oz)   Height: 1.63 m (5' 4.17\")     Body mass index is 24.08 kg/m .    GENERAL APPEARANCE: alert, appears stated age, no acute distress  HEENT: Eyes grossly normal to inspection, nares normal, MMM  RESP: lungs clear to auscultation - no rales, rhonchi, or wheezes, no increased respiratory effort  CV: regular rate and rhythm, no murmurs  ABDOMEN: soft, nontender, nondistended  MSK: extremities normal, no gross deformities noted, no lower extremity edema, negative straight leg raise bilaterally, mild TTP in lower L-spine.  L knee nontender, no pain with ROM.  No gross deformities.  No obvious meniscal injury or ligamentous injury after testing.  SKIN: no suspicious lesions or rashes   NEURO: Normal strength and tone, sensory exam grossly normal, mentation appears intact and speech normal  PSYCH: mood and affect appropriate  "

## 2021-08-31 ENCOUNTER — LAB (OUTPATIENT)
Dept: LAB | Facility: CLINIC | Age: 46
End: 2021-08-31
Payer: COMMERCIAL

## 2021-08-31 DIAGNOSIS — K21.9 GASTROESOPHAGEAL REFLUX DISEASE WITHOUT ESOPHAGITIS: ICD-10-CM

## 2021-08-31 PROCEDURE — 87338 HPYLORI STOOL AG IA: CPT

## 2021-09-02 LAB — H PYLORI AG STL QL IA: NEGATIVE

## 2021-09-10 ENCOUNTER — APPOINTMENT (OUTPATIENT)
Dept: INTERPRETER SERVICES | Facility: CLINIC | Age: 46
End: 2021-09-10
Payer: COMMERCIAL

## 2021-10-12 ENCOUNTER — OFFICE VISIT (OUTPATIENT)
Dept: FAMILY MEDICINE | Facility: CLINIC | Age: 46
End: 2021-10-12
Payer: COMMERCIAL

## 2021-10-12 ENCOUNTER — ANCILLARY PROCEDURE (OUTPATIENT)
Dept: GENERAL RADIOLOGY | Facility: CLINIC | Age: 46
End: 2021-10-12
Payer: COMMERCIAL

## 2021-10-12 VITALS
TEMPERATURE: 97.9 F | BODY MASS INDEX: 23.71 KG/M2 | OXYGEN SATURATION: 99 % | WEIGHT: 138.9 LBS | DIASTOLIC BLOOD PRESSURE: 88 MMHG | SYSTOLIC BLOOD PRESSURE: 143 MMHG | HEART RATE: 80 BPM | RESPIRATION RATE: 16 BRPM

## 2021-10-12 DIAGNOSIS — M54.50 CHRONIC MIDLINE LOW BACK PAIN WITHOUT SCIATICA: ICD-10-CM

## 2021-10-12 DIAGNOSIS — G89.29 CHRONIC MIDLINE LOW BACK PAIN WITHOUT SCIATICA: ICD-10-CM

## 2021-10-12 DIAGNOSIS — E78.00 HYPERCHOLESTEREMIA: ICD-10-CM

## 2021-10-12 DIAGNOSIS — K21.9 GASTROESOPHAGEAL REFLUX DISEASE WITHOUT ESOPHAGITIS: Primary | ICD-10-CM

## 2021-10-12 PROBLEM — K35.30 ACUTE APPENDICITIS WITH LOCALIZED PERITONITIS: Status: RESOLVED | Noted: 2020-07-31 | Resolved: 2021-10-12

## 2021-10-12 PROBLEM — Z90.49 S/P APPENDECTOMY: Status: RESOLVED | Noted: 2020-07-31 | Resolved: 2021-10-12

## 2021-10-12 PROCEDURE — 72100 X-RAY EXAM L-S SPINE 2/3 VWS: CPT | Mod: FY | Performed by: RADIOLOGY

## 2021-10-12 PROCEDURE — 99214 OFFICE O/P EST MOD 30 MIN: CPT | Mod: GC | Performed by: FAMILY MEDICINE

## 2021-10-12 RX ORDER — ROSUVASTATIN CALCIUM 20 MG/1
20 TABLET, COATED ORAL DAILY
Qty: 90 TABLET | Refills: 3 | Status: SHIPPED | OUTPATIENT
Start: 2021-10-12

## 2021-10-12 RX ORDER — FAMOTIDINE 20 MG/1
20 TABLET, FILM COATED ORAL 2 TIMES DAILY
Qty: 120 TABLET | Refills: 3 | Status: SHIPPED | OUTPATIENT
Start: 2021-10-12

## 2021-10-12 NOTE — PROGRESS NOTES
Preceptor Attestation:   Patient seen, evaluated and discussed with the resident. I have verified the content of the note, which accurately reflects my assessment of the patient and the plan of care.  Supervising Physician:Michael Cabrera MD  [facility]

## 2021-10-12 NOTE — PROGRESS NOTES
Assessment and Plan   (K21.9) Gastroesophageal reflux disease without esophagitis  (primary encounter diagnosis)  Comment: We will continue Pepcid as needed as it is helping his symptoms.  Plan: famotidine (PEPCID) 20 MG tablet          (E78.00) Hypercholesteremia  Comment: Refill rosuvastatin and increase to 20 mg daily.  Plan: rosuvastatin (CRESTOR) 20 MG tablet          (M54.50,  G89.29) Chronic midline low back pain without sciatica  Comment: Did not obtain L-spine x-ray last time so we will reorder today.  No evidence of acute pathology on my read, some evidence of degenerative changes.  Plan: XR Lumbar Spine 2/3 Views          Follow up in 2 Week(s).    Options for treatment and follow-up care were reviewed with the patient and/or guardian. Francisco Desir and/or guardian engaged in the decision making process and verbalized understanding of the options discussed and agreed with the final plan.    Abdirizak Chopra MD  [facility], PGY-3    Precepted patient with Dr. Michael Cabrera       HPI:   Francisco Desir is a 46 year old male who presents to clinic today for   Chief Complaint   Patient presents with     RECHECK     followup stomach/ fit test     Patient has been taking the stomach medication twice per day.  It makes him feel a craving for water but the burning is gone so feels like it has helped.  Had some confusion over which medication was which.  Did not have negative effects from his cholesterol medication.    A [employer]  was used for this visit         Review of Systems:     10 point ROS negative except as noted in HPI.         PMHX:   Active Problems List  Patient Active Problem List   Diagnosis     Hepatic steatosis     Retention of urine     Gastroesophageal reflux disease without esophagitis     Active problem list reviewed and updated.    Current Medications  Current Outpatient Medications   Medication Sig  Dispense Refill     famotidine (PEPCID) 20 MG tablet Take 1 tablet (20 mg) by mouth 2 times daily 120 tablet 3     rosuvastatin (CRESTOR) 20 MG tablet Take 1 tablet (20 mg) by mouth daily 90 tablet 3     Medication list reviewed and updated.    Social History  Social History     Tobacco Use     Smoking status: Never Smoker     Smokeless tobacco: Never Used   Vaping Use     Vaping Use: Never used   Substance Use Topics     Alcohol use: Yes     Comment: on occasions only.     Drug use: No     History   Drug Use No       Family History  Family History   Family history unknown: Yes       Allergies  No Known Allergies         Physical Exam:     Vitals:    10/12/21 1404   BP: (!) 143/88   Pulse: 80   Resp: 16   Temp: 97.9  F (36.6  C)   TempSrc: Oral   SpO2: 99%   Weight: 63 kg (138 lb 14.4 oz)     Body mass index is 23.71 kg/m .    GENERAL APPEARANCE: alert, appears stated age, no acute distress  HEENT: Eyes grossly normal to inspection, nares normal, MMM  RESP: lungs clear to auscultation - no rales, rhonchi, or wheezes, no increased respiratory effort  CV: regular rate and rhythm, no murmurs  ABDOMEN: soft, nontender, nondistended  MSK: extremities normal, no gross deformities noted, no lower extremity edema  SKIN: no suspicious lesions or rashes   NEURO: Normal strength and tone, sensory exam grossly normal, mentation appears intact and speech normal  PSYCH: mood and affect appropriate

## 2021-10-14 ENCOUNTER — OFFICE VISIT (OUTPATIENT)
Dept: FAMILY MEDICINE | Facility: CLINIC | Age: 46
End: 2021-10-14
Payer: COMMERCIAL

## 2021-10-14 VITALS
WEIGHT: 138 LBS | RESPIRATION RATE: 16 BRPM | SYSTOLIC BLOOD PRESSURE: 129 MMHG | DIASTOLIC BLOOD PRESSURE: 79 MMHG | BODY MASS INDEX: 23.56 KG/M2 | OXYGEN SATURATION: 99 % | HEART RATE: 74 BPM

## 2021-10-14 DIAGNOSIS — Z02.89 ENCOUNTER FOR COMPLETION OF FORM WITH PATIENT: Primary | ICD-10-CM

## 2021-10-14 PROCEDURE — 99213 OFFICE O/P EST LOW 20 MIN: CPT | Performed by: FAMILY MEDICINE

## 2021-10-14 NOTE — PROGRESS NOTES
Assessment and Plan   (Z02.89) Encounter for completion of form with patient  (primary encounter diagnosis)  Comment: Wrote work note for patient similar to prior note except with 6 week duration of 8 hour workday restriction.  Would have him follow-up in 6 weeks for recheck to determine whether he continues to require restrictions at that time.  Plan: As above.    Follow up in 6 Week(s).    Options for treatment and follow-up care were reviewed with the patient and/or guardian. Francisco Desir and/or guardian engaged in the decision making process and verbalized understanding of the options discussed and agreed with the final plan.    Abdirizak Chopra MD  [facility], PGY-3    Precepted patient with Dr. Yohan Ely III       HPI:   Francisco Desir is a 46 year old male who presents to clinic today for   Chief Complaint   Patient presents with     Letter for School/Work     Letter for work - needs state for how many hours per day - does this include breaks?, how long the restriction 8 hours?     Patient needs work note describing duration of work restrictions.  Asking for 6 month duration if possible.  Is ok with returning to clinic periodically to be reassessed.  No new symptoms at this time.  Is scheduled to see PT.    A [employer]  was used for this visit         Review of Systems:     10 point ROS negative except as noted in HPI.         PMHX:   Active Problems List  Patient Active Problem List   Diagnosis     Hepatic steatosis     Retention of urine     Gastroesophageal reflux disease without esophagitis     Active problem list reviewed and updated.    Current Medications  Current Outpatient Medications   Medication Sig Dispense Refill     famotidine (PEPCID) 20 MG tablet Take 1 tablet (20 mg) by mouth 2 times daily 120 tablet 3     rosuvastatin (CRESTOR) 20 MG tablet Take 1 tablet (20 mg) by mouth daily 90 tablet 3     Medication  list reviewed and updated.    Social History  Social History     Tobacco Use     Smoking status: Never Smoker     Smokeless tobacco: Never Used   Vaping Use     Vaping Use: Never used   Substance Use Topics     Alcohol use: Yes     Comment: on occasions only.     Drug use: No     History   Drug Use No       Family History  Family History   Family history unknown: Yes       Allergies  No Known Allergies         Physical Exam:     Vitals:    10/14/21 1636   BP: 129/79   Pulse: 74   Resp: 16   SpO2: 99%   Weight: 62.6 kg (138 lb)     Body mass index is 23.56 kg/m .    GENERAL APPEARANCE: alert, appears stated age, no acute distress  HEENT: Eyes grossly normal to inspection, nares normal, MMM  RESP: no increased respiratory effort  CV: appropriately perfused  ABDOMEN: nondistended  MSK: extremities normal, no gross deformities noted, no lower extremity edema  SKIN: no suspicious lesions or rashes   NEURO: Normal strength and tone, sensory exam grossly normal, mentation appears intact and speech normal  PSYCH: mood and affect appropriate

## 2021-10-14 NOTE — NURSING NOTE
Due to patient being non-English speaking/uses sign language, an  was used for this visit. Only for face-to-face interpretation by an external agency, date and length of interpretation can be found on the scanned worksheet.     name: Jesus  Agency: [employer]  Language: Hmong   Telephone number: [phone]  Type of interpretation: Face-to-face, spoken

## 2021-10-15 ENCOUNTER — TELEPHONE (OUTPATIENT)
Dept: FAMILY MEDICINE | Facility: CLINIC | Age: 46
End: 2021-10-15

## 2021-10-15 NOTE — TELEPHONE ENCOUNTER
Called and couldn't leave an voicemail. Voicemail is not set up. Will try again later and will put Pt on the recall list as well.

## 2021-10-18 ENCOUNTER — TELEPHONE (OUTPATIENT)
Dept: FAMILY MEDICINE | Facility: CLINIC | Age: 46
End: 2021-10-18

## 2021-10-18 NOTE — TELEPHONE ENCOUNTER
Called and couldn't leave an voicemail. Voicemail is not set up. Pt need appt for recheck back pain and work restrictions, per orange slip.    2nd time try calling. First attempted was on 10/15/2021

## 2021-10-26 ENCOUNTER — HOSPITAL ENCOUNTER (OUTPATIENT)
Dept: PHYSICAL THERAPY | Facility: REHABILITATION | Age: 46
End: 2021-10-26
Attending: FAMILY MEDICINE
Payer: COMMERCIAL

## 2021-10-26 DIAGNOSIS — M54.50 CHRONIC MIDLINE LOW BACK PAIN WITHOUT SCIATICA: Primary | ICD-10-CM

## 2021-10-26 DIAGNOSIS — G89.29 CHRONIC MIDLINE LOW BACK PAIN WITHOUT SCIATICA: Primary | ICD-10-CM

## 2021-10-26 DIAGNOSIS — R19.8 ABDOMINAL WEAKNESS: ICD-10-CM

## 2021-10-26 PROCEDURE — 97161 PT EVAL LOW COMPLEX 20 MIN: CPT | Mod: GP | Performed by: PHYSICAL THERAPIST

## 2021-10-26 PROCEDURE — 97110 THERAPEUTIC EXERCISES: CPT | Mod: GP | Performed by: PHYSICAL THERAPIST

## 2021-10-26 NOTE — PROGRESS NOTES
[facility]          OUTPATIENT PHYSICAL THERAPY ORTHOPEDIC EVALUATION  PLAN OF TREATMENT FOR OUTPATIENT REHABILITATION  (COMPLETE FOR INITIAL CLAIMS ONLY)  Patient's Last Name, First Name, M.I.  YOB: 1975  Francisco Desir    Provider s Name:  [facility]   Medical Record No.  [id number]   Start of Care Date:  10/26/21   Onset Date:  08/30/21   Type:     _X__PT   ___OT   ___SLP Medical Diagnosis:  Midline Low back pain Chronic     PT Diagnosis:  Abdominal weakness, poor postural awareness, low back pain   Visits from SOC:  1      _________________________________________________________________________________  Plan of Treatment/Functional Goals:  gait training, joint mobilization, manual therapy, neuromuscular re-education, ROM, strengthening, stretching     TENS     Goals  Goal Identifier: 1  Goal Description: Pt will walk for at least 30 mins without increase in symptoms  Target Date: 01/24/22    Goal Identifier: 2  Goal Description: Pt will report increase time sleeping without inc in pain  Target Date: 01/24/22    Goal Identifier: 3  Goal Description: Pt will report increased ability to lift objects without inc pain  Target Date: 01/24/22      Therapy Frequency:  1 time/week  Predicted Duration of Therapy Intervention:  10 weeks    Marge Jack, PT                 I CERTIFY THE NEED FOR THESE SERVICES FURNISHED UNDER        THIS PLAN OF TREATMENT AND WHILE UNDER MY CARE     (Physician co-signature of this document indicates review and certification of the therapy plan).                       Certification Date From:  10/26/21   Certification Date To:  01/24/22    Referring Provider:  Dr. Chopra    Initial Assessment        See Epic Evaluation Start of Care Date: 10/26/21                                                                     [employer]   [facility]-Pelvic Initial Evaluation    Assessment:     Patient is  "a 46 year old male that presents with signs and symptoms consistent with low back pain secondary to degeneration. Patient demonstrates impairments including poor core stability, postural awareness, myofascial tightness and generalized muscle weakness, leading to impaired functional mobility. Patient's functional limitations include standing for longer periods of time, working and performing daily household work. Today patient responded well to patient education and therapeutic exercise.    Patient educated, demonstrated understanding and is in agreement with nature of impairment, plan of care, patient role and HEP. Patient compliant with PT and prognosis is good. Patient would benefit from skilled PT to progress and improve above impairments.      Treatment Today       Exercise   Date 10/26/2021   Exercise    Prone laying    Hip roll x20 reps   DKTC (SKTC painful) 20-30\"   Piriformis stretch supine, sitting 20-30\"   QL stretch supine 20-30\"   Seated nerve glides 10-20\"                               Patient's current HEP: walking around [place], but feels symptoms with activity. It takes 45 minutes to complete a walk, but at 18 minutes he will feel pain.      Marge Jack, PT  10/26/2021     10/26/21 1500   General Information   Type of Visit Initial OP Ortho PT Evaluation   Start of Care Date 10/26/21   Referring Physician Dr. Chopra   Orders Evaluate and Treat   Date of Order 08/30/21   Certification Required? Yes   Medical Diagnosis Midline Low back pain Chronic       Present Yes   Language Hmong   Body Part(s)   Body Part(s) Lumbar Spine/SI   Presentation and Etiology   Pertinent history of current problem (include personal factors and/or comorbidities that impact the POC) Francisco is a 46 year old male who presents to therapy today with complaints of low back pain. Date of onset is 2011 and onset was while bending forwards when he felt like his back gave out. Recently he has been getting " worse, at work, with more than 8 hours he gets tingling and uncomfortable. 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 Patient has to lift and twist/turn too at work. He sleeps on his back because of his pain when he sleeps on his sides.   Impairments A. Pain   Functional Limitations perform activities of daily living;perform desired leisure / sports activities   Symptom Location both sides in the middle, no pain down the legs, driving he feels symptoms and tingling on LLE   How/Where did it occur With repetition/overuse;From insidious onset   Onset date of current episode/exacerbation 08/30/21   Chronicity Chronic   Pain quality B. Dull;C. Aching;D. Burning   Frequency of pain/symptoms D. Other   Pain frequency comment resting he feels pressure, with movement his pain feels dull achy pain, uncomfortable   Pain/symptoms are: Worse during the night   Pain/symptoms exacerbated by B. Walking;C. Lifting;G. Certain positions;L. Work tasks;M. Other   Pain exacerbation comment standing for longer periods of time   Pain/symptoms eased by I. OTC medication(s)   Progression of symptoms since onset: Worsened   Fall Risk Screen   Fall screen completed by PT   Have you fallen 2 or more times in the past year? No   Have you fallen and had an injury in the past year? No   Is patient a fall risk? No   Abuse Screen (yes response referral indicated)   Feels Unsafe at Home or Work/School no   Feels Threatened by Someone no   Does Anyone Try to Keep You From Having Contact with Others or Doing Things Outside Your Home? no   Physical Signs of Abuse Present no   Lumbar Spine/SI Objective Findings   Flexion ROM to mid shin   Extension ROM limited with pain in center   Right Side Bending ROM limited with pain   Left Side Bending ROM limited with pain   Lumbar ROM Comment WFL unless noted   Hip Screen pain on R with passive hip flex, R > L hip tighter   Hip Flexion (L2) Strength R 4/5,  tightnes felt with testing   Hip Abduction Strength 4/5 L   Knee Flexion Strength R 4/5   Ankle Dorsiflexion (L4) Strength WFL   SLR Pain with PROM of L hip   Slump Test positive bilateral   Palpation inc mucles tone R > L of lumbar paraspinals, QL   Planned Therapy Interventions   Planned Therapy Interventions gait training;joint mobilization;manual therapy;neuromuscular re-education;ROM;strengthening;stretching   Planned Modality Interventions   Planned Modality Interventions TENS   Clinical Impression   Criteria for Skilled Therapeutic Interventions Met yes, treatment indicated   PT Diagnosis Abdominal weakness, poor postural awareness, low back pain   Influenced by the following impairments pain, decreased ROM, weakness   Functional limitations due to impairments sleeping, walking, standing, sitting, bending, lifting   Clinical Presentation Stable/Uncomplicated   Clinical Decision Making (Complexity) Low complexity   Therapy Frequency 1 time/week   Predicted Duration of Therapy Intervention (days/wks) 10 weeks   Risk & Benefits of therapy have been explained Yes   Patient, Family & other staff in agreement with plan of care Yes   Clinical Impression Comments Patient is a 46 year old male that presents with signs and symptoms consistent with low back pain secondary to degeneration. Patient demonstrates impairments including poor core stability, postural awareness, myofascial tightness and generalized muscle weakness, leading to impaired functional mobility. Patient's functional limitations include standing for longer periods of time, working and performing daily household work. Today patient responded well to patient education and therapeutic exercise.   ORTHO GOALS   PT Ortho Eval Goals 1;2;3   Ortho Goal 1   Goal Identifier 1   Goal Description Pt will walk for at least 30 mins without increase in symptoms   Target Date 01/24/22   Ortho Goal 2   Goal Identifier 2   Goal Description Pt will report increase time  sleeping without inc in pain   Target Date 01/24/22   Ortho Goal 3   Goal Identifier 3   Goal Description Pt will report increased ability to lift objects without inc pain   Target Date 01/24/22   Total Evaluation Time   PT Eval, Low Complexity Minutes (88777) 20   Therapy Certification   Certification date from 10/26/21   Certification date to 01/24/22   Medical Diagnosis Midline Low back pain Chronic

## 2021-11-16 ENCOUNTER — OFFICE VISIT (OUTPATIENT)
Dept: FAMILY MEDICINE | Facility: CLINIC | Age: 46
End: 2021-11-16
Payer: COMMERCIAL

## 2021-11-16 VITALS
WEIGHT: 138 LBS | BODY MASS INDEX: 23.56 KG/M2 | RESPIRATION RATE: 20 BRPM | SYSTOLIC BLOOD PRESSURE: 128 MMHG | TEMPERATURE: 98 F | OXYGEN SATURATION: 98 % | HEART RATE: 75 BPM | DIASTOLIC BLOOD PRESSURE: 79 MMHG

## 2021-11-16 DIAGNOSIS — M54.42 CHRONIC MIDLINE LOW BACK PAIN WITH LEFT-SIDED SCIATICA: ICD-10-CM

## 2021-11-16 DIAGNOSIS — F43.21 GRIEF REACTION: ICD-10-CM

## 2021-11-16 DIAGNOSIS — F51.02 ADJUSTMENT INSOMNIA: ICD-10-CM

## 2021-11-16 DIAGNOSIS — Z02.89 ENCOUNTER FOR COMPLETION OF FORM WITH PATIENT: Primary | ICD-10-CM

## 2021-11-16 DIAGNOSIS — G89.29 CHRONIC MIDLINE LOW BACK PAIN WITH LEFT-SIDED SCIATICA: ICD-10-CM

## 2021-11-16 PROCEDURE — 99214 OFFICE O/P EST MOD 30 MIN: CPT | Performed by: FAMILY MEDICINE

## 2021-11-16 NOTE — PATIENT INSTRUCTIONS
- Please follow up if your insomnia continues to discuss alternative options for helping you to sleep better    - Follow up on or before 1/11/22 to reassess your back pain and ability to return to work     - Continue the physical therapy exercises and follow-up appointments

## 2021-11-16 NOTE — PROGRESS NOTES
Assessment & Plan     Encounter for completion of form with patient  Chronic midline low back pain with left-sided sciatica  Patient here to reassess work restrictions for chronic midline low back pain with left-sided sciatica. Unremarkable lumbar spine XR imaging. Symptoms include radicular pain and numbness/tingling down lateral left leg to knee. No associated myelopathy. Symptoms exacerbated by work activities including heavy lifting and prolonged standing.   - Work restrictions extended to allow completion of scheduled PT sessions in December. Added weight restriction of less than 25 lb as well as maximum shift of 8 hours. To reassess in 8 weeks.   - Continue physical therapy as scheduled.     Grief reaction  Recent loss of his mother last week. Grieving and stressed about financial and  logistical details. Impacts on sleep as below.  - Provided support.   - Discussed additional resources to help with the grieving process including psychotherapy. Patient declined today.     Adjustment insomnia  New onset insomnia with sleep maintenance challenges in the context of recent loss of his mother. Patient is using Advil PM with some temporary relief.   - Discussed risks/benefits of OTC sleep aids like Advil PM, including concerns about the potential for this to exacerbate urinary retention, of which this patient has a history.   - Briefly discussed alternative options. Patient to consider and revisit alternatives in the next 2 weeks if this issue persists.    37 minutes spent on the date of the encounter doing chart review, patient visit and documentation        Follow up in 8 weeks or sooner if worsening symptoms.     Audelia Del Angel MD  [facility] KEVIN Singh is a 46 year old who presents for the following health issues     Due to language barrier, an  was present during the history-taking and subsequent discussion (and for part of the physical exam) with  this patient.      HPI   His mother recently passed away last week. This has been upsetting and disruptive to his sleep. He also feels stressed in dealing with all of the transactions related to his mother's death. He has good family support. The  home is backed up so the  will not be held until early 2022. This delay is another source of stress as it complicates the process. He didn't have issues with insomnia prior to her death. He now reports difficulty maintaining sleep. He falls asleep fairly easily but it is not sustained. He struggles to calm his thoughts to get back to sleep due to all of these recent stressors. He tries to take Advil PM to release the tension in his upper back and neck that results from this stress. Sometimes this helps him to fall back asleep with the sedating effect.     Also here to follow up on his back pain. His low back pain is about the same. It is on the left side. Some radicular numbness on the outer left leg to the back of the knee. No weakness, saddle anesthesia, and urinary or bowel changes. Started physical therapy but has only had the initial session. He will be starting the regular series of appointment in December. He has been working with restrictions of his shifts that limit them to 8 hours per day or less. 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 He does a lot of repetitive work that involves twisting and carrying heavier loads, up to 30-40 lbs, which can be quite hard on his back. He stands for the entire shift and as it approaches the end of the 8 hour shift he experiences significant pain. He feels that the work hour restrictions are meeting his needs at this time.           Objective    /79   Pulse 75   Temp 98  F (36.7  C) (Oral)   Resp 20   Wt 62.6 kg (138 lb)   SpO2 98%   BMI 23.56 kg/m    Body mass index is 23.56 kg/m .  Physical Exam   GENERAL: healthy, alert and no distress  NEURO: Normal strength and  tone, normal gait, mentation intact and speech normal  PSYCH: mentation appears normal, tearful, judgement and insight intact and appearance well groomed

## 2021-11-16 NOTE — LETTER
November 16, 2021      Francisco Desir  [address] [place] [address]        To Whom It May Concern:    Francisco Desir  was seen in clinic 11/16/2021 for reassessment of his back pain.  Please excuse him for any missed work today. In addition, please continue to limit his work hours to no more than 8 hours per shift. He also should lift no more than 25 lb at one time. These two restrictions will be reassessed at his next follow up appointment in 8 weeks (through 1/11/22) once he has been able to complete more physical therapy treatments. If you need further paperwork completed, do not hesitate to send it to our clinic. Our fax number is [phone].       Sincerely,        Audelia Del Angel MD

## 2021-12-07 ENCOUNTER — HOSPITAL ENCOUNTER (OUTPATIENT)
Dept: PHYSICAL THERAPY | Facility: REHABILITATION | Age: 46
End: 2021-12-07
Payer: COMMERCIAL

## 2021-12-07 DIAGNOSIS — M54.50 CHRONIC MIDLINE LOW BACK PAIN WITHOUT SCIATICA: Primary | ICD-10-CM

## 2021-12-07 DIAGNOSIS — G89.29 CHRONIC MIDLINE LOW BACK PAIN WITHOUT SCIATICA: Primary | ICD-10-CM

## 2021-12-07 DIAGNOSIS — R19.8 ABDOMINAL WEAKNESS: ICD-10-CM

## 2021-12-07 PROCEDURE — 97110 THERAPEUTIC EXERCISES: CPT | Mod: GP

## 2021-12-07 NOTE — PROGRESS NOTES
[facility]   Lumbo-Pelvic Initial Evaluation     Assessment:     Patient returns for first follow up from intal evaluation on 10/26/21 for LBP, reporting no change overall and is performing exercises at home, but after passing of mother he is finding it hard to complete HEP.  Initially patient would like to cancel remaining appts until January after his mother's  but after some thought he will keep remaining but hold a tentative schedule in case stress becomes too much and a new evaluation is needed. PT will place new PT order for new evaluation if needed. Patient tolerated review of HEP today and addition of strengthening HEP with minimal complaints or increase of pain.      From Initial Evaluation:  Patient is a 46 year old male that presents with signs and symptoms consistent with low back pain secondary to degeneration. Patient demonstrates impairments including poor core stability, postural awareness, myofascial tightness and generalized muscle weakness, leading to impaired functional mobility. Patient's functional limitations include standing for longer periods of time, working and performing daily household work. Today patient responded well to patient education and therapeutic exercise.      Treatment Today       Date 12/7/21 10/26/21   Exercise     Prone laying     Hip Roll reviewed x20   DKTC (SK painful) reviewed 20-30s   Piriformis stretch supine, seated reviewed 20-30s   QL stretch reviewed 20-30s   Nerve Glide reviewed x10-20   Abdominal isometric Hold 5 sec x 10    bridge x10    Prone glute extension X 10                       Patient's current HEP: walking around [place], but feels symptoms with activity. It takes 45 minutes to complete a walk, but at 18 minutes he will feel pain.      Trina De La O, SPT

## 2021-12-22 ENCOUNTER — HOSPITAL ENCOUNTER (OUTPATIENT)
Dept: PHYSICAL THERAPY | Facility: REHABILITATION | Age: 46
End: 2021-12-22
Payer: COMMERCIAL

## 2021-12-22 DIAGNOSIS — M54.50 CHRONIC MIDLINE LOW BACK PAIN WITHOUT SCIATICA: Primary | ICD-10-CM

## 2021-12-22 DIAGNOSIS — R19.8 ABDOMINAL WEAKNESS: ICD-10-CM

## 2021-12-22 DIAGNOSIS — G89.29 CHRONIC MIDLINE LOW BACK PAIN WITHOUT SCIATICA: Primary | ICD-10-CM

## 2021-12-22 PROCEDURE — 97140 MANUAL THERAPY 1/> REGIONS: CPT | Mod: GP | Performed by: PHYSICAL THERAPIST

## 2021-12-22 PROCEDURE — 97110 THERAPEUTIC EXERCISES: CPT | Mod: GP | Performed by: PHYSICAL THERAPIST

## 2021-12-22 NOTE — PROGRESS NOTES
[facility]   Lumbo-Pelvic Initial Evaluation     Assessment:     Patient returns for second follow up as he wanted to take the last few weeks off due to a death in the family, reporting he is feeling the same pain symptoms, however hasn't been able to do his exercises much. PT expressed the importance of consistency with exercise as that is how we will see progress. Patient found benefit from review of simple HEP and manual therapy, as he was presenting with no change of pain symptoms today. Patient would benefit from continuing skilled PT and progressing exercises as he becomes consistent with his HEP on his own.     From Initial Evaluation:  Patient is a 46 year old male that presents with signs and symptoms consistent with low back pain secondary to degeneration. Patient demonstrates impairments including poor core stability, postural awareness, myofascial tightness and generalized muscle weakness, leading to impaired functional mobility. Patient's functional limitations include standing for longer periods of time, working and performing daily household work. Today patient responded well to patient education and therapeutic exercise.      Treatment Today       Date 12/22/21 12/7/21 10/26/21   Exercise      Prone laying      Hip Roll Reviewed  reviewed x20   DKTC (SK painful) Reviewed  reviewed 20-30s   Piriformis stretch supine, seated reviewed reviewed 20-30s   QL stretch reviewed reviewed 20-30s   Nerve Glide  reviewed x10-20   Abdominal isometric Hold off Hold 5 sec x 10    bridge Hold off x10    Prone glute extension Hold off X 10    Nustep  3 minutes                      Patient's current HEP: walking around [place], but feels symptoms with activity. It takes 45 minutes to complete a walk, but at 18 minutes he will feel pain.     Marge Heath, PT

## 2022-01-12 ENCOUNTER — OFFICE VISIT (OUTPATIENT)
Dept: FAMILY MEDICINE | Facility: CLINIC | Age: 47
End: 2022-01-12
Payer: COMMERCIAL

## 2022-01-12 VITALS
DIASTOLIC BLOOD PRESSURE: 66 MMHG | HEART RATE: 82 BPM | BODY MASS INDEX: 22.18 KG/M2 | SYSTOLIC BLOOD PRESSURE: 120 MMHG | OXYGEN SATURATION: 96 % | WEIGHT: 138 LBS | RESPIRATION RATE: 20 BRPM | TEMPERATURE: 98.1 F | HEIGHT: 66 IN

## 2022-01-12 DIAGNOSIS — R05.9 COUGH: Primary | ICD-10-CM

## 2022-01-12 DIAGNOSIS — G89.29 CHRONIC MIDLINE LOW BACK PAIN WITH LEFT-SIDED SCIATICA: ICD-10-CM

## 2022-01-12 DIAGNOSIS — M54.42 CHRONIC MIDLINE LOW BACK PAIN WITH LEFT-SIDED SCIATICA: ICD-10-CM

## 2022-01-12 PROCEDURE — 99000 SPECIMEN HANDLING OFFICE-LAB: CPT | Performed by: STUDENT IN AN ORGANIZED HEALTH CARE EDUCATION/TRAINING PROGRAM

## 2022-01-12 PROCEDURE — U0005 INFEC AGEN DETEC AMPLI PROBE: HCPCS | Mod: 90 | Performed by: STUDENT IN AN ORGANIZED HEALTH CARE EDUCATION/TRAINING PROGRAM

## 2022-01-12 PROCEDURE — 99213 OFFICE O/P EST LOW 20 MIN: CPT | Mod: 25 | Performed by: STUDENT IN AN ORGANIZED HEALTH CARE EDUCATION/TRAINING PROGRAM

## 2022-01-12 PROCEDURE — U0003 INFECTIOUS AGENT DETECTION BY NUCLEIC ACID (DNA OR RNA); SEVERE ACUTE RESPIRATORY SYNDROME CORONAVIRUS 2 (SARS-COV-2) (CORONAVIRUS DISEASE [COVID-19]), AMPLIFIED PROBE TECHNIQUE, MAKING USE OF HIGH THROUGHPUT TECHNOLOGIES AS DESCRIBED BY CMS-2020-01-R: HCPCS | Mod: 90 | Performed by: STUDENT IN AN ORGANIZED HEALTH CARE EDUCATION/TRAINING PROGRAM

## 2022-01-12 ASSESSMENT — MIFFLIN-ST. JEOR: SCORE: 1443.71

## 2022-01-12 NOTE — PROGRESS NOTES
Assessment and Plan   (R05.9) Cough  (primary encounter diagnosis)  Comment: Tested for COVID-19.  Most likely a viral infection of some variety with mild symptoms so far.  Nontoxic.  Plan: Symptomatic; Unknown COVID-19 Virus         (Coronavirus) by PCR          (M54.42,  G89.29) Chronic midline low back pain with left-sided sciatica  Comment: Ongoing work restrictions provided while he works with PT.  Is still working but will restrict to 8 hours per day, 40 hours per week with breaks to sit every 2 hours.  Follow-up in 2-3 months for reassessment.  Plan: As above.    Follow up in 2-3 months.    Options for treatment and follow-up care were reviewed with the patient and/or guardian. Francisco Desir and/or guardian engaged in the decision making process and verbalized understanding of the options discussed and agreed with the final plan.    Abdirizak Chopra MD  [facility], PGY-3    Precepted patient with Dr. Michael Cabrera.       HPI:   Francisco Desir is a 46 year old male who presents to clinic today for   Chief Complaint   Patient presents with     RECHECK     Back pain and work restriction     Cough     on and off. No fever or chills. No recent exposed anyone with covid     Medication Reconciliation     Completed     Patient presents for recheck of back and also for new cough and scratchy throat within the last couple of days.  No other symptoms, no known exposures.      Requesting ongoing work restrictions to no more than 8 hours per day and 40 hours per week for his back.  Has been able to see PT a couple of times since last visit.    A [employer]  was used for this visit         Review of Systems:     Complete ROS negative except as noted in HPI.         PMHX:   Active Problems List  Patient Active Problem List   Diagnosis     Hepatic steatosis     Retention of urine     Gastroesophageal reflux disease without esophagitis  "    Active problem list reviewed and updated.    Current Medications  Current Outpatient Medications   Medication Sig Dispense Refill     famotidine (PEPCID) 20 MG tablet Take 1 tablet (20 mg) by mouth 2 times daily 120 tablet 3     rosuvastatin (CRESTOR) 20 MG tablet Take 1 tablet (20 mg) by mouth daily 90 tablet 3     Medication list reviewed and updated.    Social History  Social History     Tobacco Use     Smoking status: Never Smoker     Smokeless tobacco: Never Used   Vaping Use     Vaping Use: Never used   Substance Use Topics     Alcohol use: Yes     Comment: on occasions only.     Drug use: No     History   Drug Use No       Family History  Family History   Family history unknown: Yes       Allergies  Allergies   Allergen Reactions     No Known Allergies             Physical Exam:     Vitals:    01/12/22 1357   BP: 120/66   Pulse: 82   Resp: 20   Temp: 98.1  F (36.7  C)   SpO2: 96%   Weight: 62.6 kg (138 lb)   Height: 1.676 m (5' 6\")     Body mass index is 22.27 kg/m .    GENERAL APPEARANCE: alert, appears stated age, no acute distress.  HEENT: Eyes grossly normal to inspection, nares normal, MMM.  RESP: no increased respiratory effort.  CV: good capillary refill.  ABDOMEN: nondistended.  MSK: extremities normal, no gross deformities noted, no lower extremity edema.  SKIN: no suspicious lesions or rashes.  NEURO: Normal strength and tone, sensory exam grossly normal, mentation appears intact and speech normal.  PSYCH: mood and affect appropriate.  "

## 2022-01-12 NOTE — NURSING NOTE
Due to patient being non-English speaking/uses sign language, an  was used for this visit. Only for face-to-face interpretation by an external agency, date and length of interpretation can be found on the scanned worksheet.     name: Marina Vigil  Agency: [employer]  Language: Anant   Telephone number: [phone]  Type of interpretation: Face-to-face, spoken

## 2022-01-13 ENCOUNTER — TELEPHONE (OUTPATIENT)
Dept: FAMILY MEDICINE | Facility: CLINIC | Age: 47
End: 2022-01-13

## 2022-01-13 LAB
SARS-COV-2 RNA RESP QL NAA+PROBE: DETECTED
SARS-COV-2 RNA RESP QL NAA+PROBE: NORMAL

## 2022-01-13 NOTE — TELEPHONE ENCOUNTER
Coronavirus (COVID-19) Notification    Reason for call  Notify of POSITIVE  COVID-19 lab result, assess symptoms,  review [facility] recommendations    Lab Result   Lab test for 2019-nCoV rRt-PCR or SARS-COV-2 PCR  Oropharyngeal AND/OR nasopharyngeal swabs were POSITIVE for 2019-nCoV RNA [OR] SARS-COV-2 RNA (COVID-19) RNA     We have been unable to reach Patient by phone at this time to notify of their Positive COVID-19 result.  Left voicemail message requesting a call back to [phone] [facility] for results.        POSITIVE COVID-19 Letter sent.    Ellen Cesar LPN

## 2022-02-02 ENCOUNTER — HOSPITAL ENCOUNTER (OUTPATIENT)
Dept: PHYSICAL THERAPY | Facility: REHABILITATION | Age: 47
End: 2022-02-02
Payer: COMMERCIAL

## 2022-02-02 DIAGNOSIS — M54.50 CHRONIC MIDLINE LOW BACK PAIN WITHOUT SCIATICA: Primary | ICD-10-CM

## 2022-02-02 DIAGNOSIS — R19.8 ABDOMINAL WEAKNESS: ICD-10-CM

## 2022-02-02 DIAGNOSIS — G89.29 CHRONIC MIDLINE LOW BACK PAIN WITHOUT SCIATICA: Primary | ICD-10-CM

## 2022-02-02 PROCEDURE — 97110 THERAPEUTIC EXERCISES: CPT | Mod: GP

## 2022-02-02 PROCEDURE — 97140 MANUAL THERAPY 1/> REGIONS: CPT | Mod: GP

## 2022-02-09 ENCOUNTER — HOSPITAL ENCOUNTER (OUTPATIENT)
Dept: PHYSICAL THERAPY | Facility: REHABILITATION | Age: 47
End: 2022-02-09
Payer: COMMERCIAL

## 2022-02-09 DIAGNOSIS — M54.50 CHRONIC MIDLINE LOW BACK PAIN WITHOUT SCIATICA: Primary | ICD-10-CM

## 2022-02-09 DIAGNOSIS — R19.8 ABDOMINAL WEAKNESS: ICD-10-CM

## 2022-02-09 DIAGNOSIS — G89.29 CHRONIC MIDLINE LOW BACK PAIN WITHOUT SCIATICA: Primary | ICD-10-CM

## 2022-02-09 PROCEDURE — 97110 THERAPEUTIC EXERCISES: CPT | Mod: GP

## 2022-02-09 PROCEDURE — 97140 MANUAL THERAPY 1/> REGIONS: CPT | Mod: GP

## 2022-02-16 ENCOUNTER — HOSPITAL ENCOUNTER (OUTPATIENT)
Dept: PHYSICAL THERAPY | Facility: REHABILITATION | Age: 47
End: 2022-02-16
Payer: COMMERCIAL

## 2022-02-16 DIAGNOSIS — M54.50 CHRONIC MIDLINE LOW BACK PAIN WITHOUT SCIATICA: Primary | ICD-10-CM

## 2022-02-16 DIAGNOSIS — G89.29 CHRONIC MIDLINE LOW BACK PAIN WITHOUT SCIATICA: Primary | ICD-10-CM

## 2022-02-16 DIAGNOSIS — R19.8 ABDOMINAL WEAKNESS: ICD-10-CM

## 2022-02-16 PROCEDURE — 97110 THERAPEUTIC EXERCISES: CPT | Mod: GP

## 2022-02-28 ENCOUNTER — OFFICE VISIT (OUTPATIENT)
Dept: FAMILY MEDICINE | Facility: CLINIC | Age: 47
End: 2022-02-28
Payer: COMMERCIAL

## 2022-02-28 VITALS
BODY MASS INDEX: 22.24 KG/M2 | SYSTOLIC BLOOD PRESSURE: 127 MMHG | WEIGHT: 137.8 LBS | RESPIRATION RATE: 16 BRPM | HEART RATE: 88 BPM | DIASTOLIC BLOOD PRESSURE: 83 MMHG | OXYGEN SATURATION: 99 %

## 2022-02-28 DIAGNOSIS — J45.909 ASTHMA, UNSPECIFIED ASTHMA SEVERITY, UNSPECIFIED WHETHER COMPLICATED, UNSPECIFIED WHETHER PERSISTENT: ICD-10-CM

## 2022-02-28 DIAGNOSIS — M54.50 CHRONIC MIDLINE LOW BACK PAIN WITHOUT SCIATICA: Primary | ICD-10-CM

## 2022-02-28 DIAGNOSIS — G89.29 CHRONIC MIDLINE LOW BACK PAIN WITHOUT SCIATICA: Primary | ICD-10-CM

## 2022-02-28 PROCEDURE — 99213 OFFICE O/P EST LOW 20 MIN: CPT | Mod: GC | Performed by: STUDENT IN AN ORGANIZED HEALTH CARE EDUCATION/TRAINING PROGRAM

## 2022-02-28 NOTE — PROGRESS NOTES
Assessment and Plan   (M54.50,  G89.29) Chronic midline low back pain without sciatica  (primary encounter diagnosis)  Comment: Patient is compliant with PT and course of therapy and has been experiencing some improvement, but unfortunately loses this improvement with his work at his job.  Provided a work note with restrictions to try to help him recover a bit more while he continues to work with PT.  Continue current plan of care otherwise and reassess in 4-6 weeks to determine whether he requires ongoing restrictions.  Plan: As above.  Will have him see our care coordinator to see about help with finances for affording PT.    (J45.909) Asthma, unspecified asthma severity, unspecified whether complicated, unspecified whether persistent  Comment: Not in exacerbation.  Plan: No additional intervention at this time.    Follow up in 4-6 weeks.    Options for treatment and follow-up care were reviewed with the patient and/or guardian. Francisco Desir and/or guardian engaged in the decision making process and verbalized understanding of the options discussed and agreed with the final plan.    Abdirizak Chopra MD  [facility], PGY-3    Precepted patient with Dr. Royce Shultz.       HPI:   Francisco Desir is a 46 year old male who presents to clinic today for   Chief Complaint   Patient presents with     RECHECK     follow up back pain     Feels like back is not doing well.  Feels like PT helps but then with work recently he feels that he has lost his progress because of his at-work responsibilities.  Is still working with PT except for last week because he had to take care of one of his children.  He does not think there is anything he could change to at work that would be less physically demanding.  There are no other positions at work that would be open to him that would be less demanding.      A [employer]  was used for this visit.          Review of Systems:     Complete ROS negative except as noted in HPI.         PMHX:   Active Problems List  Patient Active Problem List   Diagnosis     Hepatic steatosis     Retention of urine     Gastroesophageal reflux disease without esophagitis     Asthma     Active problem list reviewed and updated.    Current Medications  Current Outpatient Medications   Medication Sig Dispense Refill     famotidine (PEPCID) 20 MG tablet Take 1 tablet (20 mg) by mouth 2 times daily 120 tablet 3     rosuvastatin (CRESTOR) 20 MG tablet Take 1 tablet (20 mg) by mouth daily 90 tablet 3     Medication list reviewed and updated.    Social History  Social History     Tobacco Use     Smoking status: Never Smoker     Smokeless tobacco: Never Used   Vaping Use     Vaping Use: Never used   Substance Use Topics     Alcohol use: Yes     Comment: on occasions only.     Drug use: No     History   Drug Use No       Family History  Family History   Family history unknown: Yes       Allergies  Allergies   Allergen Reactions     No Known Allergies             Physical Exam:     Vitals:    02/28/22 1527   BP: 127/83   Pulse: 88   Resp: 16   SpO2: 99%   Weight: 62.5 kg (137 lb 12.8 oz)     Body mass index is 22.24 kg/m .    GENERAL APPEARANCE: alert, appears stated age, no acute distress.  HEENT: Eyes grossly normal to inspection, nares normal, MMM.  RESP: no increased respiratory effort.  CV: good capillary refill.  ABDOMEN: nondistended.  MSK: extremities normal, no gross deformities noted, no lower extremity edema.  SKIN: no suspicious lesions or rashes.  NEURO: Normal strength and tone, sensory exam grossly normal, mentation appears intact and speech normal.  PSYCH: mood and affect appropriate.

## 2022-03-01 ENCOUNTER — PATIENT OUTREACH (OUTPATIENT)
Dept: FAMILY MEDICINE | Facility: CLINIC | Age: 47
End: 2022-03-01
Payer: COMMERCIAL

## 2022-03-01 NOTE — PROGRESS NOTES
Clinic Care Coordination Contact    Follow Up Progress Note      Assessment: The pt had brought in a bill from [facility]. The pt also had a referral for Care Coordination. I called our billing office about the pt bill. They stated that they have re-summit the pt bill to [facility]. The pt should not owe anything, and the pt can disregard the pt bill. I asked about the pt financial support. Pt stated that he was just worried about his bill, and not being able to pay for it. Pt stated that since I was able to help resolve it, he does not have any concerns.     Care Gaps:    Health Maintenance Due   Topic Date Due     PREVENTIVE CARE VISIT  Never done     ASTHMA ACTION PLAN  Never done     ASTHMA CONTROL TEST  Never done     ADVANCE CARE PLANNING  Never done     Pneumococcal Vaccine: Pediatrics (0 to 5 Years) and At-Risk Patients (6 to 64 Years) (1 of 2 - PPSV23) Never done     COLORECTAL CANCER SCREENING  Never done     INFLUENZA VACCINE (1) 09/01/2021     COVID-19 Vaccine (3 - Booster for Moderna series) 10/18/2021       Currently there are no Care Gaps.    Goals addressed this encounter:   Goals Addressed    None         Intervention/Education provided during outreach: NA          Plan:     Care Coordinator will follow up in month

## 2022-03-02 ENCOUNTER — OFFICE VISIT (OUTPATIENT)
Dept: FAMILY MEDICINE | Facility: CLINIC | Age: 47
End: 2022-03-02
Payer: COMMERCIAL

## 2022-03-02 VITALS
OXYGEN SATURATION: 98 % | WEIGHT: 134.12 LBS | RESPIRATION RATE: 16 BRPM | SYSTOLIC BLOOD PRESSURE: 128 MMHG | BODY MASS INDEX: 21.65 KG/M2 | DIASTOLIC BLOOD PRESSURE: 84 MMHG | TEMPERATURE: 98.1 F | HEART RATE: 76 BPM

## 2022-03-02 DIAGNOSIS — G89.29 CHRONIC MIDLINE LOW BACK PAIN WITHOUT SCIATICA: Primary | ICD-10-CM

## 2022-03-02 DIAGNOSIS — M54.50 CHRONIC MIDLINE LOW BACK PAIN WITHOUT SCIATICA: Primary | ICD-10-CM

## 2022-03-02 PROCEDURE — 99213 OFFICE O/P EST LOW 20 MIN: CPT | Mod: GC | Performed by: STUDENT IN AN ORGANIZED HEALTH CARE EDUCATION/TRAINING PROGRAM

## 2022-03-02 NOTE — LETTER
[facility] [address] [place] [address]  [phone]          March 2, 2022    RE:  Francisco Desir                                                                                                                                                       [address]  [place] [address]            To whom it may concern:    Francisco Desir is under my professional care for Chronic midline low back pain without sciatica He  may return to work with the following: The employee is ABLE to return to work today.    When the patient returns to work, the following restrictions apply until next visit:  A) Bend and twist: Occasionally (1-3 hours)  B) Squat: Occasionally (1-3 hours)  C) Walk/Stand: Frequently (4-8 hours)  D) Reach Above Shoulders: Frequently (4-8 hours)  E) Lift, carry, push, and pull no more than:  11-20 lbs.  F) Maximum shift length of 8 hours.    Mr Desir has not reached maximal medical improvement.        Sincerely,        Umair Chopra MD

## 2022-03-02 NOTE — NURSING NOTE
Due to patient being non-English speaking/uses sign language, an  was used for this visit. Only for face-to-face interpretation by an external agency, date and length of interpretation can be found on the scanned worksheet.     name: Diana  Agency: [employer]  Language: Harveyong   Telephone number: [phone]  Type of interpretation: Face-to-face, spoken

## 2022-03-02 NOTE — PROGRESS NOTES
Assessment and Plan   (M54.50,  G89.29) Chronic midline low back pain without sciatica  (primary encounter diagnosis)  Comment: Discussed work obligations and medical reasons for restrictions with patient and provided updated work note with more specific restrictions.  I think he does require bending/twisting restrictions at this time but provided more specific instructions regarding the amount of time he may do this at work.  Plan: As above.    Follow up in 4-6 weeks.    Options for treatment and follow-up care were reviewed with the patient and/or guardian. Francisco Desir and/or guardian engaged in the decision making process and verbalized understanding of the options discussed and agreed with the final plan.    Abdirizak Chopra MD  [facility], PGY-3    Precepted patient with Dr. Michael Cabrera.       HPI:   Francisco Desir is a 46 year old male who presents to clinic today for   Chief Complaint   Patient presents with     Letter Request     Pt requesting letter for work     Patient stating he requires updated work letter, as his job does not have a position for him that would allow him not to bend or twist.  He feels that he is otherwise capable of performing his job functions within the restrictions provided.    A [employer]  was used for this visit         Review of Systems:     Complete ROS negative except as noted in HPI.         PMHX:   Active Problems List  Patient Active Problem List   Diagnosis     Hepatic steatosis     Retention of urine     Gastroesophageal reflux disease without esophagitis     Asthma     Active problem list reviewed and updated.    Current Medications  Current Outpatient Medications   Medication Sig Dispense Refill     famotidine (PEPCID) 20 MG tablet Take 1 tablet (20 mg) by mouth 2 times daily 120 tablet 3     rosuvastatin (CRESTOR) 20 MG tablet Take 1 tablet (20 mg) by mouth daily 90 tablet 3      Medication list reviewed and updated.    Social History  Social History     Tobacco Use     Smoking status: Never Smoker     Smokeless tobacco: Never Used   Vaping Use     Vaping Use: Never used   Substance Use Topics     Alcohol use: Yes     Comment: on occasions only.     Drug use: No     History   Drug Use No       Family History  Family History   Family history unknown: Yes       Allergies  Allergies   Allergen Reactions     No Known Allergies             Physical Exam:     Vitals:    03/02/22 1440   BP: 128/84   Pulse: 76   Resp: 16   Temp: 98.1  F (36.7  C)   TempSrc: Oral   SpO2: 98%   Weight: 60.8 kg (134 lb 1.9 oz)     Body mass index is 21.65 kg/m .    GENERAL APPEARANCE: alert, appears stated age, no acute distress.  HEENT: Eyes grossly normal to inspection, nares normal, MMM.  RESP: no increased respiratory effort.  CV: good capillary refill.  ABDOMEN: nondistended.  MSK: extremities normal, no gross deformities noted, no lower extremity edema.  No midline tenderness with palpation.  Has some pain in low back with bending/twisting.  Pain does not radiate.  SKIN: no suspicious lesions or rashes.  NEURO: Normal strength and tone, sensory exam grossly normal, mentation appears intact and speech normal.  PSYCH: mood and affect appropriate.

## 2022-04-07 ENCOUNTER — OFFICE VISIT (OUTPATIENT)
Dept: FAMILY MEDICINE | Facility: CLINIC | Age: 47
End: 2022-04-07
Payer: COMMERCIAL

## 2022-04-07 VITALS
WEIGHT: 135.08 LBS | SYSTOLIC BLOOD PRESSURE: 131 MMHG | BODY MASS INDEX: 22.51 KG/M2 | HEIGHT: 65 IN | TEMPERATURE: 97.7 F | HEART RATE: 85 BPM | RESPIRATION RATE: 20 BRPM | OXYGEN SATURATION: 97 % | DIASTOLIC BLOOD PRESSURE: 80 MMHG

## 2022-04-07 DIAGNOSIS — M54.41 CHRONIC MIDLINE LOW BACK PAIN WITH RIGHT-SIDED SCIATICA: Primary | ICD-10-CM

## 2022-04-07 DIAGNOSIS — G89.29 CHRONIC MIDLINE LOW BACK PAIN WITH RIGHT-SIDED SCIATICA: Primary | ICD-10-CM

## 2022-04-07 PROCEDURE — 99213 OFFICE O/P EST LOW 20 MIN: CPT | Mod: GC | Performed by: STUDENT IN AN ORGANIZED HEALTH CARE EDUCATION/TRAINING PROGRAM

## 2022-04-07 ASSESSMENT — ASTHMA QUESTIONNAIRES: ACT_TOTALSCORE: 25

## 2022-04-07 ASSESSMENT — PAIN SCALES - GENERAL: PAINLEVEL: NO PAIN (0)

## 2022-04-07 NOTE — PROGRESS NOTES
Due to patient being non-English speaking/uses sign language, an  was used for this visit. Only for face-to-face interpretation by an external agency, date and length of interpretation can be found on the scanned worksheet.     name: Diana   Agency: [employer]  Language: [place]ong   Telephone number: [phone]  Type of interpretation: Face-to-face, spoken

## 2022-04-07 NOTE — PROGRESS NOTES
Preceptor Attestation:   Patient seen, evaluated and discussed with the resident. I have verified the content of the note, which accurately reflects my assessment of the patient and the plan of care.    Supervising Physician:Micki Devine MD    [facility]

## 2022-04-07 NOTE — PROGRESS NOTES
Assessment and Plan   (M54.41,  G89.29) Chronic midline low back pain with right-sided sciatica  (primary encounter diagnosis)  Comment: Patient is able to work with the restrictions provided so we will renew these for several more months to try to give him some additional time to recover.  He has tried physical therapy and did not find it to be helpful so with his lack of general improvement and sciatica it seems reasonable to refer him to a spine specialist to see if a procedure such as an injection might help.  He would like to try this.  Plan: Spine Referral          Follow up in 3 months.    Options for treatment and follow-up care were reviewed with the patient and/or guardian. Francisco Desir and/or guardian engaged in the decision making process and verbalized understanding of the options discussed and agreed with the final plan.    Abdirizak Chopra MD  [facility], PGY-3    Precepted patient with Dr. Micki Devine.       HPI:   Francisco Desir is a 46 year old male who presents to clinic today for   Chief Complaint   Patient presents with     Musculoskeletal Problem     Back Pain/ need form for work     Patient has been feeling the same.  He has still been working and feels that work is still stressful for his back.  Went to PT for a month and it was not too helpful.  Work was able to work with the provided restrictions and he feels like they did help and he was able to work with those restrictions in place.  Would like those restrictions renewed if possible.  Wondering if there is a doctor he could see who does massage.  Did the stretches and exercises at home but felt like it did not help much.      The pain is really mostly at work with his work-related activities with bending and lifting and not so much at home.    He does sometimes feel some numbness/tingling down the back of his R leg.  This has been going on for about a year  "and a half.  It is intermittent and he mostly notices it when he rides in the car or sits for a long time, like more than an hour.  A little bit concerned about it being related to his spine.    A [employer]  was used for this visit.         Review of Systems:     Complete ROS negative except as noted in HPI.         PMHX:   Active Problems List  Patient Active Problem List   Diagnosis     Hepatic steatosis     Retention of urine     Gastroesophageal reflux disease without esophagitis     Asthma     Active problem list reviewed and updated.    Current Medications  Current Outpatient Medications   Medication Sig Dispense Refill     famotidine (PEPCID) 20 MG tablet Take 1 tablet (20 mg) by mouth 2 times daily (Patient not taking: Reported on 4/7/2022) 120 tablet 3     rosuvastatin (CRESTOR) 20 MG tablet Take 1 tablet (20 mg) by mouth daily (Patient not taking: Reported on 4/7/2022) 90 tablet 3     Medication list reviewed and updated.    Social History  Social History     Tobacco Use     Smoking status: Never Smoker     Smokeless tobacco: Never Used   Vaping Use     Vaping Use: Never used   Substance Use Topics     Alcohol use: Yes     Comment: on occasions only.     Drug use: No     History   Drug Use No       Family History  Family History   Family history unknown: Yes       Allergies  Allergies   Allergen Reactions     No Known Allergies             Physical Exam:     Vitals:    04/07/22 1543   BP: 131/80   Pulse: 85   Resp: 20   Temp: 97.7  F (36.5  C)   TempSrc: Oral   SpO2: 97%   Weight: 61.3 kg (135 lb 1.3 oz)   Height: 1.651 m (5' 5\")     Body mass index is 22.48 kg/m .    GENERAL APPEARANCE: alert, appears stated age, no acute distress.  HEENT: Eyes grossly normal to inspection, nares normal, MMM.  RESP: no increased respiratory effort.  CV: good capillary refill.  ABDOMEN: nondistended.  MSK: extremities normal, no gross deformities noted, no lower extremity edema.  Point tenderness to palpation in his " low back over his spine.  Straight leg raise negative bilaterally.  Has some pain with ROM of lower back.  SKIN: no suspicious lesions or rashes.  NEURO: Normal strength and tone, sensory exam grossly normal, mentation appears intact and speech normal.  PSYCH: mood and affect appropriate.

## 2022-04-07 NOTE — LETTER
RETURN TO WORK/SCHOOL FORM    4/7/2022    Re: Francisco Desir  1975    To Whom It May Concern:     Francisco Desir was seen in clinic today and reevaluated for his health condition.  In my medical opinion, he should have additional work restrictions at this time as his back pain does not allow him to tolerate some of the work in his current job.  At this time, I would recommend restrictions of no lifting greater than 25 lbs, and no shift lengths of greater than 8 hours.  These restrictions should remain in place at least until 7/5/2022, prior to which he should return for reevaluation to determine whether he requires ongoing restrictions.    Umair Chopra MD  4/7/2022 4:21 PM

## 2022-04-15 NOTE — PROGRESS NOTES
ASSESSMENT: Francisco Desir is a 47 year old male with past medical history significant for asthma, GERD who presents today for new patient evaluation of chronic low back pain with radiation into the right lower extremity with associated numbness, tingling, weakness.  My review of an x-ray lumbar spine shows mild disc degeneration L2-3 and L5-S1.  I am concerned that the patient has a disc bulge or protrusion affecting the right L5 nerve root.  Pain has been refractory to conservative treatment including physical therapy.  On exam he demonstrated breakaway weakness right ankle dorsiflexors and right EHL.  He also complains of some pain radiating up to the shoulder region which I suspect is myofascial in nature.    PLAN:  A shared decision making model was used.  The patient's values and choices were respected.  The following represents what was discussed and decided upon by the physician assistant and the patient.  A telephone  was used for the visit.    1.  DIAGNOSTIC TESTS: I reviewed the x-ray lumbar spine.  I ordered an MRI lumbar spine for further evaluation.    2.  PHYSICAL THERAPY: Patient completed 6 sessions of physical therapy February 16, 2022.  Encourage patient to do home exercises.  No further physical therapy was ordered.    3.  MEDICATIONS:    -I prescribed gabapentin 300 mg.  He may titrate this dose up to 300 mg 3 times daily.  - Patient can continue the Tylenol as needed.    4.  INTERVENTIONS: No interventions were ordered.  We will await the results of his MRI lumbar spine.  If this shows right L5 impingement I will recommend an epidural steroid injection.    5.  PATIENT EDUCATION: Patient is in agreement the above plan.  All questions were answered.    6.  FOLLOW-UP:   International call the patient with results of his MRI lumbar spine.  At that time I will likely either order an epidural steroid injection or recommend a follow-up visit with me to review the results and discuss  treatment options.  If he has questions or concerns in the meantime, he should not hesitate to call.      SUBJECTIVE:  Francisco Desir  Is a 47 year old male who presents today in consultation at request of Dr. Chopra for new patient evaluation of low back pain with radiation into the right lower extremity with numbness, tingling, weakness.  Patient reports that he began to have back pain in 2011 after he lifted a basket of clothes weighing 3 to 5 pounds.  He states he had abrupt onset of severe low back pain at that time and has had back problems ever since.  For the past 1 year he has had symptoms radiating down the right leg which are getting progressively worse.    Patient complains of bilateral low back pain.  Pain radiates into the right upper buttock.  He then describes a painful tingling sensation that radiates into the right posterolateral thigh, wraps around into the anterior shin, and extends into the dorsal foot.  He states he has tingling in all toes of the right foot.  He states his leg feels weak and tired.  Pain is aggravated prolonged sitting which causes a burning pain.  With running and walking he feels a stabbing pain in the back.  He also has increased pain with prolonged standing.  He denies any alleviating factors to the pain.  Denies loss of bowel or bladder control.  Denies recent fevers, chills, sweats.    Patient completed 6 sessions of physical therapy in February 2022.  He did not like this was very helpful.  He does still do his home exercises.  He does not go to a chiropractor.  He has never had any spine surgeries or spine injections.  He takes Tylenol as needed which is somewhat helpful.    Current Outpatient Medications   Medication     famotidine (PEPCID) 20 MG tablet     rosuvastatin (CRESTOR) 20 MG tablet       Allergies   Allergen Reactions     No Known Allergies        Past Medical History:   Diagnosis Date     Acute appendicitis with localized peritonitis 7/31/2020     S/P  appendectomy 7/31/2020        Patient Active Problem List   Diagnosis     Hepatic steatosis     Retention of urine     Gastroesophageal reflux disease without esophagitis     Asthma       Past Surgical History:   Procedure Laterality Date     [place] LAP,APPENDECTOMY N/A 3/18/2017    Procedure: APPENDECTOMY, LAPAROSCOPIC;  Surgeon: Yohan Blancas MD;  Location: [facility];  Service: General       Family History   Family history unknown: Yes       Social history: Patient is .  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  He drinks alcohol occasionally.  Denies tobacco use.  Denies illicit drug use.      ROS: Positive for headache, muscle pain, sciatica, dizziness.  Specifically negative for bowel/bladder dysfunction, fevers,chills, appetite changes, unexplained weight loss.   Otherwise 13 systems reviewed are negative.  Please see the patient's intake questionnaire from today for details.      OBJECTIVE:  PHYSICAL EXAMINATION:    CONSTITUTIONAL:  Vital signs as above.  No acute distress.  The patient is well nourished and well groomed.  PSYCHIATRIC:  The patient is awake, alert, oriented to person, place, time and answering questions appropriately with clear speech.    HEENT: Normocephalic, atraumatic.  Sclera clear.  Neck is supple.  SKIN:  Skin over the face, bilateral lower extremities, and posterior torso is clean, dry, intact without rashes.    GAIT:  Gait is antalgic, favoring the right.  The patient is able to heel and toe walk without significant difficulty.    STANDING EXAMINATION: No significant tenderness to palpation lumbar spinous processes or bilateral lumbar paraspinous muscles, although he does have hypertonicity right greater than left lumbar paraspinous muscles.  MUSCLE STRENGTH:  The patient has breakaway weakness right ankle dorsiflexors and right EHL.  5/5 strength for the bilateral hip flexors, knee flexors/extensors, left ankle  dorsiflexors, bilateral ankle plantar flexors, left great toe extensors.  NEUROLOGICAL: 2+ patellar, and achilles reflexes bilaterally.  Negative Babinski's bilaterally.  No ankle clonus bilaterally. Sensation to light touch is intact in the bilateral L4, L5, and S1 dermatomes.  VASCULAR:  2/4 dorsalis pedis pulses bilaterally.  Bilateral lower extremities are warm.  There is no pitting edema of the bilateral lower extremities.  ABDOMINAL:  Soft, non-distended, non-tender throughout all quadrants.  No pulsatile mass palpated in the left lower quadrant.  LYMPH NODES:  No palpable or tender inguinal lymph nodes.  MUSCULOSKELETAL: Straight leg raise positive bilaterally.    RESULTS: I reviewed the x-ray lumbar spine from [facility] dated October 12, 2021.  This shows mild L2-L3 and L5-S1 disc degeneration.  Please see report for further details.

## 2022-04-18 ENCOUNTER — OFFICE VISIT (OUTPATIENT)
Dept: PHYSICAL MEDICINE AND REHAB | Facility: CLINIC | Age: 47
End: 2022-04-18
Payer: COMMERCIAL

## 2022-04-18 VITALS
HEART RATE: 81 BPM | DIASTOLIC BLOOD PRESSURE: 88 MMHG | BODY MASS INDEX: 22.48 KG/M2 | WEIGHT: 134.9 LBS | HEIGHT: 65 IN | SYSTOLIC BLOOD PRESSURE: 129 MMHG

## 2022-04-18 DIAGNOSIS — M54.16 LUMBAR RADICULITIS: Primary | ICD-10-CM

## 2022-04-18 PROCEDURE — 99204 OFFICE O/P NEW MOD 45 MIN: CPT | Performed by: PHYSICIAN ASSISTANT

## 2022-04-18 RX ORDER — GABAPENTIN 300 MG/1
CAPSULE ORAL
Qty: 90 CAPSULE | Refills: 0 | Status: SHIPPED | OUTPATIENT
Start: 2022-04-18 | End: 2022-06-28

## 2022-04-18 ASSESSMENT — PAIN SCALES - GENERAL: PAINLEVEL: SEVERE PAIN (6)

## 2022-04-18 NOTE — PATIENT INSTRUCTIONS
[facility] Scheduling    Please call [phone] to schedule your image(s) (select option#1). There are 2 different locations, see below. You can do walk-in visits for xray only images if you want.     [facility]  [address] [place], [place] [address] [facility] [address]    Prescribed Gabapentin today, 300 mg tablets, to be titrated up to 1 tablets 3 times a day as tolerated for your nerve pain. Please follow Gabapentin dosing chart below.    Gabapentin 300mg Dosing Chart    DATE  MORNING AFTERNOON BEDTIME    Day 1 0 0 1    Day 2 0 0 1    Day 3 0 0 1    Day 4 1 0 1    Day 5 1 0 1    Day 6 1 0 1    Day 7 1 1 1    Day 8 1 1 1    Day 9 1 1 1                                                                                                                                   Continue medication, taking 1 capsules three times daily  Please call if you have any questions regarding how to take your medication

## 2022-04-18 NOTE — LETTER
4/18/2022         RE: Francisco Desir  [address] [place] [address]        Dear Colleague,    Thank you for referring your patient, Francisco Desir, to the [facility]. Please see a copy of my visit note below.    ASSESSMENT: Francisco Desir is a 47 year old male with past medical history significant for asthma, GERD who presents today for new patient evaluation of chronic low back pain with radiation into the right lower extremity with associated numbness, tingling, weakness.  My review of an x-ray lumbar spine shows mild disc degeneration L2-3 and L5-S1.  I am concerned that the patient has a disc bulge or protrusion affecting the right L5 nerve root.  Pain has been refractory to conservative treatment including physical therapy.  On exam he demonstrated breakaway weakness right ankle dorsiflexors and right EHL.  He also complains of some pain radiating up to the shoulder region which I suspect is myofascial in nature.    PLAN:  A shared decision making model was used.  The patient's values and choices were respected.  The following represents what was discussed and decided upon by the physician assistant and the patient.  A telephone  was used for the visit.    1.  DIAGNOSTIC TESTS: I reviewed the x-ray lumbar spine.  I ordered an MRI lumbar spine for further evaluation.    2.  PHYSICAL THERAPY: Patient completed 6 sessions of physical therapy February 16, 2022.  Encourage patient to do home exercises.  No further physical therapy was ordered.    3.  MEDICATIONS:    -I prescribed gabapentin 300 mg.  He may titrate this dose up to 300 mg 3 times daily.  - Patient can continue the Tylenol as needed.    4.  INTERVENTIONS: No interventions were ordered.  We will await the results of his MRI lumbar spine.  If this shows right L5 impingement I will recommend an epidural steroid injection.    5.  PATIENT EDUCATION: Patient is in agreement the above plan.  All questions  were answered.    6.  FOLLOW-UP:   International call the patient with results of his MRI lumbar spine.  At that time I will likely either order an epidural steroid injection or recommend a follow-up visit with me to review the results and discuss treatment options.  If he has questions or concerns in the meantime, he should not hesitate to call.      SUBJECTIVE:  Francisco Desir  Is a 47 year old male who presents today in consultation at request of Dr. Chopra for new patient evaluation of low back pain with radiation into the right lower extremity with numbness, tingling, weakness.  Patient reports that he began to have back pain in 2011 after he lifted a basket of clothes weighing 3 to 5 pounds.  He states he had abrupt onset of severe low back pain at that time and has had back problems ever since.  For the past 1 year he has had symptoms radiating down the right leg which are getting progressively worse.    Patient complains of bilateral low back pain.  Pain radiates into the right upper buttock.  He then describes a painful tingling sensation that radiates into the right posterolateral thigh, wraps around into the anterior shin, and extends into the dorsal foot.  He states he has tingling in all toes of the right foot.  He states his leg feels weak and tired.  Pain is aggravated prolonged sitting which causes a burning pain.  With running and walking he feels a stabbing pain in the back.  He also has increased pain with prolonged standing.  He denies any alleviating factors to the pain.  Denies loss of bowel or bladder control.  Denies recent fevers, chills, sweats.    Patient completed 6 sessions of physical therapy in February 2022.  He did not like this was very helpful.  He does still do his home exercises.  He does not go to a chiropractor.  He has never had any spine surgeries or spine injections.  He takes Tylenol as needed which is somewhat helpful.    Current Outpatient Medications   Medication      famotidine (PEPCID) 20 MG tablet     rosuvastatin (CRESTOR) 20 MG tablet       Allergies   Allergen Reactions     No Known Allergies        Past Medical History:   Diagnosis Date     Acute appendicitis with localized peritonitis 7/31/2020     S/P appendectomy 7/31/2020        Patient Active Problem List   Diagnosis     Hepatic steatosis     Retention of urine     Gastroesophageal reflux disease without esophagitis     Asthma       Past Surgical History:   Procedure Laterality Date     [place] LAP,APPENDECTOMY N/A 3/18/2017    Procedure: APPENDECTOMY, LAPAROSCOPIC;  Surgeon: Yohan Blancas MD;  Location: [facility];  Service: General       Family History   Family history unknown: Yes       Social history: Patient is .  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  He drinks alcohol occasionally.  Denies tobacco use.  Denies illicit drug use.      ROS: Positive for headache, muscle pain, sciatica, dizziness.  Specifically negative for bowel/bladder dysfunction, fevers,chills, appetite changes, unexplained weight loss.   Otherwise 13 systems reviewed are negative.  Please see the patient's intake questionnaire from today for details.      OBJECTIVE:  PHYSICAL EXAMINATION:    CONSTITUTIONAL:  Vital signs as above.  No acute distress.  The patient is well nourished and well groomed.  PSYCHIATRIC:  The patient is awake, alert, oriented to person, place, time and answering questions appropriately with clear speech.    HEENT: Normocephalic, atraumatic.  Sclera clear.  Neck is supple.  SKIN:  Skin over the face, bilateral lower extremities, and posterior torso is clean, dry, intact without rashes.    GAIT:  Gait is antalgic, favoring the right.  The patient is able to heel and toe walk without significant difficulty.    STANDING EXAMINATION: No significant tenderness to palpation lumbar spinous processes or bilateral lumbar paraspinous muscles, although he  does have hypertonicity right greater than left lumbar paraspinous muscles.  MUSCLE STRENGTH:  The patient has breakaway weakness right ankle dorsiflexors and right EHL.  5/5 strength for the bilateral hip flexors, knee flexors/extensors, left ankle dorsiflexors, bilateral ankle plantar flexors, left great toe extensors.  NEUROLOGICAL: 2+ patellar, and achilles reflexes bilaterally.  Negative Babinski's bilaterally.  No ankle clonus bilaterally. Sensation to light touch is intact in the bilateral L4, L5, and S1 dermatomes.  VASCULAR:  2/4 dorsalis pedis pulses bilaterally.  Bilateral lower extremities are warm.  There is no pitting edema of the bilateral lower extremities.  ABDOMINAL:  Soft, non-distended, non-tender throughout all quadrants.  No pulsatile mass palpated in the left lower quadrant.  LYMPH NODES:  No palpable or tender inguinal lymph nodes.  MUSCULOSKELETAL: Straight leg raise positive bilaterally.    RESULTS: I reviewed the x-ray lumbar spine from [facility] dated October 12, 2021.  This shows mild L2-L3 and L5-S1 disc degeneration.  Please see report for further details.        Again, thank you for allowing me to participate in the care of your patient.        Sincerely,        Chelsey Vargas PA-C

## 2022-05-16 ENCOUNTER — HOSPITAL ENCOUNTER (OUTPATIENT)
Dept: MRI IMAGING | Facility: HOSPITAL | Age: 47
Discharge: HOME | End: 2022-05-16
Attending: PHYSICIAN ASSISTANT | Admitting: PHYSICIAN ASSISTANT
Payer: COMMERCIAL

## 2022-05-16 DIAGNOSIS — M54.16 LUMBAR RADICULITIS: ICD-10-CM

## 2022-05-16 PROCEDURE — 72148 MRI LUMBAR SPINE W/O DYE: CPT

## 2022-05-17 ENCOUNTER — TELEPHONE (OUTPATIENT)
Dept: PHYSICAL MEDICINE AND REHAB | Facility: CLINIC | Age: 47
End: 2022-05-17
Payer: COMMERCIAL

## 2022-05-17 ENCOUNTER — APPOINTMENT (OUTPATIENT)
Dept: INTERPRETER SERVICES | Facility: CLINIC | Age: 47
End: 2022-05-17
Payer: COMMERCIAL

## 2022-05-17 NOTE — TELEPHONE ENCOUNTER
Call placed to patient with provider's results and recommendations utilizing assistance of [employer] . Results and recommendations given. Pt stated understanding. Pt was transferred to scheduling to make follow-up appointment with Sandra

## 2022-05-17 NOTE — TELEPHONE ENCOUNTER
----- Message from Chelsey Vargas PA-C sent at 5/17/2022 12:32 PM CDT -----  Please call this patient and let him know that I reviewed the MRI lumbar spine.  There is some mild wear and tear to the cartilage disks in the lower back.  There is less significant narrowing around the nerve than I was expecting based on his symptoms/exam.  I would like the patient to return to the clinic to review the results so that I can reevaluate and determine next steps.

## 2022-05-25 NOTE — PROGRESS NOTES
Assessment:   Francisco Desir is a 47 year old y.o. male with past medical history significant for asthma, GERD who presents today for follow-up regarding chronic low back pain with radiation into the right lower extremity with associated numbness, tingling, weakness.  My review of an MRI lumbar spine shows a right paracentral annular tear at L5-S1 but no significant neural compromise.  Pain has changed since I last saw him.  His right leg pain has improved significantly.  He only experiences right leg pain/tingling with prolonged sitting.  His lumbar spine pain is now located at the midline near the thoracolumbar junction.  He is neurologically intact on exam today.  His right ankle dorsiflexors and EHL weakness has resolved.       Plan:     A shared decision making plan was used.  The patient's values and choices were respected.  The following represents what was discussed and decided upon by the physician assistant and the patient.  A telephone  was used for the visit.    1.  DIAGNOSTIC TESTS: I reviewed the MRI lumbar spine.  No further diagnostic tests were ordered.    2.  PHYSICAL THERAPY: Patient completed 6 sessions of physical therapy February 16, 2022.    I entered a new referral for the patient to [facility] instead.  He needs to focus on strengthening.    3.  MEDICATIONS:    -Patient has been taking gabapentin 300 mg daily.  It is not helping and he is experiencing side effects including fatigue, nausea, excessive thirst.  He should stop this medication.  - I encouraged the patient to increase his Tylenol.  He can take up to 1000 mg 3 times daily.    4.  INTERVENTIONS: No interventions were ordered.    5.  PATIENT EDUCATION: Patient is in agreement the above plan.  All questions were answered.    6.  FOLLOW-UP: Patient can follow-up with me in 2 months to monitor progress with [facility] therapy.  If he has questions or concerns in the meantime, he should not hesitate to  call.    Subjective:     Francisco Desir is a 47 year old male who presents today for follow-up regarding low back pain with radiation into the right lower extremity with numbness, tingling, weakness.  I saw the patient in consultation April 18, 2022.  He returns today to review his MRI lumbar spine.  Patient states that his pain has changed since I last saw him.  He states he now experiences pain higher up in the back, near the thoracolumbar junction.  He only experiences right leg pain if he is sitting for prolonged periods of time.  His weakness has resolved.    Patient complains of midline back pain near the thoracolumbar junction.  He states with prolonged sitting he experiences some pain that radiates down the lateral right leg.  He has numbness and tingling in the right foot on the dorsal aspect with prolonged sitting but that resolves with repositioning.  Denies weakness.  Denies loss of bowel or bladder control.  Denies recent fevers.  He rates his pain today as a 6 out of 10.  At its worst it is a 7 out of 10.  At its best it is a 6 out of 10.  Pain is aggravated with carrying heavy items, standing, twisting, turning.  It is worse after a long day at work.  Pain is alleviated with rest and he feels best in the morning after a good night of sleep.  He states that he has difficulty doing the exercises on his own at home because he is not sure if he is doing them correctly.    Patient completed 6 sessions of physical therapy February 16, 2022.  Gabapentin caused fatigue, nausea, excessive thirst.  He decreased his dose from twice a day to once a day.  It is not helping.  He takes Tylenol as needed which helps somewhat.    Review of Systems:  Positive for intermittent numbness and tingling.  Negative for weakness, loss of bowel/bladder control, inability to urinate, headache, pain much worse at night, trip/stumble/falls medical D swallowing, difficulty with hand skills, fevers, unintentional weight loss.      Objective:   CONSTITUTIONAL:  Vital signs as above.  No acute distress.  The patient is well nourished and well groomed.    PSYCHIATRIC:  The patient is awake, alert, oriented to person, place and time.  The patient is answering questions appropriately with clear speech.  Normal affect.  HEENT: Normocephalic, atraumatic.  Sclera clear.    SKIN:  Skin over the face, posterior torso, bilateral upper and lower extremities is clean, dry, intact without rashes.  VASCULAR: No significant lower extremity edema.  MUSCULOSKELETAL:  Gait is non-antalgic.  The patient is able to heel and toe walk without any difficulty.  No significant tenderness over the thoracic spinous processes.  No significant tenderness palpation bilateral  lumbar paraspinal muscles.      The patient has 5/5 strength for the bilateral hip flexors, knee flexors/extensors, ankle dorsiflexors/plantar flexors, ankle evertors/invertors.    NEUROLOGICAL: 1-2+ patellar, achilles reflexes which are symmetric bilaterally.  No ankle clonus bilaterally.  Sensation to light touch is intact in the bilateral L4, L5, and S1 dermatomes.       RESULTS: I reviewed the MRI lumbar spine from [facility] dated May 16, 2022.  At L4-5 there is a central annular tear but no neural compromise.  At L5-S1 there is a right paracentral annular tear but no neural compromise.

## 2022-05-26 ENCOUNTER — OFFICE VISIT (OUTPATIENT)
Dept: PHYSICAL MEDICINE AND REHAB | Facility: CLINIC | Age: 47
End: 2022-05-26
Payer: COMMERCIAL

## 2022-05-26 VITALS
HEIGHT: 65 IN | BODY MASS INDEX: 22.33 KG/M2 | WEIGHT: 134 LBS | SYSTOLIC BLOOD PRESSURE: 125 MMHG | DIASTOLIC BLOOD PRESSURE: 80 MMHG | HEART RATE: 78 BPM

## 2022-05-26 DIAGNOSIS — M51.369 DDD (DEGENERATIVE DISC DISEASE), LUMBAR: ICD-10-CM

## 2022-05-26 DIAGNOSIS — M54.50 LUMBAR SPINE PAIN: Primary | ICD-10-CM

## 2022-05-26 PROCEDURE — 99214 OFFICE O/P EST MOD 30 MIN: CPT | Performed by: PHYSICIAN ASSISTANT

## 2022-05-26 ASSESSMENT — PAIN SCALES - GENERAL: PAINLEVEL: SEVERE PAIN (6)

## 2022-05-26 NOTE — LETTER
5/26/2022         RE: Francisco Desir  [address] [place] [address]        Dear Colleague,    Thank you for referring your patient, Francisco Desir, to the [facility]. Please see a copy of my visit note below.    Assessment:   Francisco Desir is a 47 year old y.o. male with past medical history significant for asthma, GERD who presents today for follow-up regarding chronic low back pain with radiation into the right lower extremity with associated numbness, tingling, weakness.  My review of an MRI lumbar spine shows a right paracentral annular tear at L5-S1 but no significant neural compromise.  Pain has changed since I last saw him.  His right leg pain has improved significantly.  He only experiences right leg pain/tingling with prolonged sitting.  His lumbar spine pain is now located at the midline near the thoracolumbar junction.  He is neurologically intact on exam today.  His right ankle dorsiflexors and EHL weakness has resolved.       Plan:     A shared decision making plan was used.  The patient's values and choices were respected.  The following represents what was discussed and decided upon by the physician assistant and the patient.  A telephone  was used for the visit.    1.  DIAGNOSTIC TESTS: I reviewed the MRI lumbar spine.  No further diagnostic tests were ordered.    2.  PHYSICAL THERAPY: Patient completed 6 sessions of physical therapy February 16, 2022.    I entered a new referral for the [facility].  He needs to focus on strengthening.    3.  MEDICATIONS:    -Patient has been taking gabapentin 300 mg daily.  It is not helping and he is experiencing side effects including fatigue, nausea, excessive thirst.  He should stop this medication.  - I encouraged the patient to increase his Tylenol.  He can take up to 1000 mg 3 times daily.    4.  INTERVENTIONS: No interventions were ordered.    5.  PATIENT EDUCATION: Patient is  in agreement the above plan.  All questions were answered.    6.  FOLLOW-UP: Patient can follow-up with me in 2 months to monitor progress with [facility] therapy.  If he has questions or concerns in the meantime, he should not hesitate to call.    Subjective:     Francisoc Desir is a 47 year old male who presents today for follow-up regarding low back pain with radiation into the right lower extremity with numbness, tingling, weakness.  I saw the patient in consultation April 18, 2022.  He returns today to review his MRI lumbar spine.  Patient states that his pain has changed since I last saw him.  He states he now experiences pain higher up in the back, near the thoracolumbar junction.  He only experiences right leg pain if he is sitting for prolonged periods of time.  His weakness has resolved.    Patient complains of midline back pain near the thoracolumbar junction.  He states with prolonged sitting he experiences some pain that radiates down the lateral right leg.  He has numbness and tingling in the right foot on the dorsal aspect with prolonged sitting but that resolves with repositioning.  Denies weakness.  Denies loss of bowel or bladder control.  Denies recent fevers.  He rates his pain today as a 6 out of 10.  At its worst it is a 7 out of 10.  At its best it is a 6 out of 10.  Pain is aggravated with carrying heavy items, standing, twisting, turning.  It is worse after a long day at work.  Pain is alleviated with rest and he feels best in the morning after a good night of sleep.  He states that he has difficulty doing the exercises on his own at home because he is not sure if he is doing them correctly.    Patient completed 6 sessions of physical therapy February 16, 2022.  Gabapentin caused fatigue, nausea, excessive thirst.  He decreased his dose from twice a day to once a day.  It is not helping.  He takes Tylenol as needed which helps somewhat.    Review of Systems:  Positive for intermittent  numbness and tingling.  Negative for weakness, loss of bowel/bladder control, inability to urinate, headache, pain much worse at night, trip/stumble/falls medical D swallowing, difficulty with hand skills, fevers, unintentional weight loss.     Objective:   CONSTITUTIONAL:  Vital signs as above.  No acute distress.  The patient is well nourished and well groomed.    PSYCHIATRIC:  The patient is awake, alert, oriented to person, place and time.  The patient is answering questions appropriately with clear speech.  Normal affect.  HEENT: Normocephalic, atraumatic.  Sclera clear.    SKIN:  Skin over the face, posterior torso, bilateral upper and lower extremities is clean, dry, intact without rashes.  VASCULAR: No significant lower extremity edema.  MUSCULOSKELETAL:  Gait is non-antalgic.  The patient is able to heel and toe walk without any difficulty.  No significant tenderness over the thoracic spinous processes.  No significant tenderness palpation bilateral  lumbar paraspinal muscles.      The patient has 5/5 strength for the bilateral hip flexors, knee flexors/extensors, ankle dorsiflexors/plantar flexors, ankle evertors/invertors.    NEUROLOGICAL: 1-2+ patellar, achilles reflexes which are symmetric bilaterally.  No ankle clonus bilaterally.  Sensation to light touch is intact in the bilateral L4, L5, and S1 dermatomes.       RESULTS: I reviewed the MRI lumbar spine from [facility] dated May 16, 2022.  At L4-5 there is a central annular tear but no neural compromise.  At L5-S1 there is a right paracentral annular tear but no neural compromise.          Again, thank you for allowing me to participate in the care of your patient.        Sincerely,        Chelsey Vargas PA-C

## 2022-05-26 NOTE — PATIENT INSTRUCTIONS
An order for physicaltherapy has been provided today.  Someone will call you to schedule physical therapy or you can call [phone] to schedule physical therapy.  It will be very important for you to do your physical therapy exercises on aregular basis to decrease your pain and prevent future flares of pain.       Stop gabapentin.    Take tylenol up to 1000 mg three times per day.

## 2022-06-28 ENCOUNTER — OFFICE VISIT (OUTPATIENT)
Dept: FAMILY MEDICINE | Facility: CLINIC | Age: 47
End: 2022-06-28
Payer: COMMERCIAL

## 2022-06-28 VITALS
HEART RATE: 77 BPM | RESPIRATION RATE: 22 BRPM | WEIGHT: 133 LBS | TEMPERATURE: 98.3 F | SYSTOLIC BLOOD PRESSURE: 127 MMHG | DIASTOLIC BLOOD PRESSURE: 79 MMHG | BODY MASS INDEX: 22.16 KG/M2 | HEIGHT: 65 IN | OXYGEN SATURATION: 97 %

## 2022-06-28 DIAGNOSIS — G89.29 CHRONIC MIDLINE LOW BACK PAIN WITH RIGHT-SIDED SCIATICA: Primary | ICD-10-CM

## 2022-06-28 DIAGNOSIS — M54.41 CHRONIC MIDLINE LOW BACK PAIN WITH RIGHT-SIDED SCIATICA: Primary | ICD-10-CM

## 2022-06-28 PROCEDURE — 99213 OFFICE O/P EST LOW 20 MIN: CPT | Mod: GC | Performed by: STUDENT IN AN ORGANIZED HEALTH CARE EDUCATION/TRAINING PROGRAM

## 2022-06-28 NOTE — PROGRESS NOTES
Assessment and Plan   (M54.41,  G89.29) Chronic midline low back pain with right-sided sciatica  (primary encounter diagnosis)  Comment: Patient seems to be stable overall although pain has worsened slightly without doing PT for the last couple of months.  Will get him back in with [facility] per physiatrist recommendation.  Ongoing work restrictions given for 3 months and reassess at that time.  I am encouraged that he is still able to work full shifts at his job with just a 25 lb lifting restriction in place.  Current work restrictions are 25 lb lifting restriction and no shifts longer than 8 hours.  Gabapentin discontinued by physiatrist as it was not helping and was causing some grogginess.  Sciatica only bothers him when sitting for prolonged periods.  Plan: Physical Therapy Referral          Follow up in 3 months.    Options for treatment and follow-up care were reviewed with the patient and/or guardian. Francisco Desir and/or guardian engaged in the decision making process and verbalized understanding of the options discussed and agreed with the final plan.    Abdirizak Chopra MD  [facility], PGY-3    Precepted patient with Dr. Audelia Del Angel.       HPI:   Francisco Desir is a 47 year old male who presents to clinic today for   Chief Complaint   Patient presents with     Letter for School/Work     Needs extended work letter     Medication Reconciliation     Needs attention     Patient still having low back pain.  Was doing PT exercises but not anymore and has not gone back since his phys med appointment when it was recommended because he felt that it did not help earlier.  His pain now is feeling a bit worse compared to a few months ago.  Uses a belt at work and when he takes off the belt feels like the pain is a bit worse.  Otherwise feels that he is able to perform his job functions.  The work restrictions that are in place are really  "helpful for him.  As long as he has those restrictions in place he feels that he can work a full day.  Still occasionally having the shooting pain down his R leg if he sits for a long time but it does not bother him so much if he is able to get up and be active.  He did not want to continue PT at the site he was at because he did not have an in-person .    A TabbedOut  was used for this visit.         Review of Systems:     Complete ROS negative except as noted in HPI.         PMHX:   Active Problems List  Patient Active Problem List   Diagnosis     Hepatic steatosis     Retention of urine     Gastroesophageal reflux disease without esophagitis     Asthma     Active problem list reviewed and updated.    Current Medications  Current Outpatient Medications   Medication Sig Dispense Refill     famotidine (PEPCID) 20 MG tablet Take 1 tablet (20 mg) by mouth 2 times daily (Patient not taking: No sig reported) 120 tablet 3     rosuvastatin (CRESTOR) 20 MG tablet Take 1 tablet (20 mg) by mouth daily (Patient not taking: No sig reported) 90 tablet 3     Medication list reviewed and updated.    Social History  Social History     Tobacco Use     Smoking status: Never Smoker     Smokeless tobacco: Never Used   Vaping Use     Vaping Use: Never used   Substance Use Topics     Alcohol use: Yes     Comment: on occasions only.     Drug use: No     History   Drug Use No       Family History  Family History   Family history unknown: Yes       Allergies  Allergies   Allergen Reactions     No Known Allergies             Physical Exam:     Vitals:    06/28/22 1547   BP: 127/79   Pulse: 77   Resp: 22   Temp: 98.3  F (36.8  C)   SpO2: 97%   Weight: 60.3 kg (133 lb)   Height: 1.651 m (5' 5\")     Body mass index is 22.13 kg/m .    GENERAL APPEARANCE: alert, appears stated age, no acute distress.  HEENT: Eyes grossly normal to inspection, nares normal, MMM.  RESP: no increased respiratory effort.  CV: good capillary " refill.  ABDOMEN: nondistended.  MSK: extremities normal, no gross deformities noted, no lower extremity edema.  SKIN: no suspicious lesions or rashes.  NEURO: Normal strength and tone, sensory exam grossly normal, mentation appears intact and speech normal.  PSYCH: mood and affect appropriate.

## 2022-06-28 NOTE — NURSING NOTE
Due to patient being non-English speaking/uses sign language, an  was used for this visit. Only for face-to-face interpretation by an external agency, date and length of interpretation can be found on the scanned worksheet.     name: Ana edgar  Agency: [employer]  Language: Anant   Telephone number: [phone]  Type of interpretation: Face-to-face, spoken

## 2022-08-12 NOTE — PROGRESS NOTES
[facility]    Outpatient Physical Therapy Discharge Note  Patient: Francisco Desir  : 1975    Beginning/End Dates of Reporting Period:  10/26/21 to 22    Referring Provider: Yuliana Cadet Diagnosis: Chronic LBP     Client Self Report: Pt reports that his back pain comes and goes depending on what he does during the day. After patient works is when he usually experiences his pain the most, especially if he does a lot of lifting and carrying but if he doesn't it is more manageable. Pain rated 6/10 today. Pt states exercises are helping a little, pt does 30 minutes a day after work and before bed. Pt has a hard time with sleeping.      Objective Measurements:             Goals:  Goal Identifier 1   Goal Description Pt will walk for at least 30 mins without increase in symptoms   Target Date 22   Date Met      Progress (detail required for progress note): not addressed today; continue goal     Goal Identifier 2   Goal Description Pt will report increase time sleeping without inc in pain   Target Date 22   Date Met      Progress (detail required for progress note): not addressed today; continue goal     Goal Identifier 3   Goal Description Pt will report increased ability to lift objects without inc pain   Target Date 22   Date Met      Progress (detail required for progress note): pt continues to report pain with lifting at work; continue goal     Goal Identifier     Goal Description     Target Date     Date Met      Progress (detail required for progress note):       Goal Identifier     Goal Description     Target Date     Date Met      Progress (detail required for progress note):       Goal Identifier     Goal Description     Target Date     Date Met      Progress (detail required for progress note):       Goal Identifier     Goal Description     Target Date     Date Met       Progress (detail required for progress note):       Goal Identifier     Goal Description     Target Date     Date Met      Progress (detail required for progress note):             Plan:  Discharge from therapy.    Discharge:    Reason for Discharge: Patient chooses to discontinue therapy.        Discharge Plan: Patient to continue home program.

## 2022-08-12 NOTE — ADDENDUM NOTE
Encounter addended by: Yuni Myles, PT on: 8/12/2022 8:10 AM   Actions taken: Clinical Note Signed, Episode resolved

## 2022-08-29 ENCOUNTER — TRANSCRIBE ORDERS (OUTPATIENT)
Dept: PHYSICAL MEDICINE AND REHAB | Facility: CLINIC | Age: 47
End: 2022-08-29

## 2022-08-29 DIAGNOSIS — G89.29 CHRONIC BILATERAL LOW BACK PAIN WITHOUT SCIATICA: Primary | ICD-10-CM

## 2022-08-29 DIAGNOSIS — M54.50 CHRONIC BILATERAL LOW BACK PAIN WITHOUT SCIATICA: Primary | ICD-10-CM

## 2022-09-20 NOTE — PROGRESS NOTES
Assessment:   Francisco Desir is a 47 year old y.o. male with past medical history significant for asthma, GERD who presents today for follow-up regarding chronic low back pain.  Pain is in the midline at the thoracolumbar junction.  He has intermittent tingling in the right dorsal foot and anterior ankle only with prolonged sitting.  Otherwise he does not have any lower extremity symptoms.  My review of an MRI lumbar spine shows a right paracentral annular tear at L5-S1 but no significant neural compromise.  There is nothing abnormal on his MRI near the thoracolumbar junction.       Plan:     A shared decision making plan was used.  The patient's values and choices were respected.  The following represents what was discussed and decided upon by the physician assistant and the patient.  A telephone  was used for the visit.    1.  DIAGNOSTIC TESTS: I reviewed the MRI lumbar spine.  No further diagnostic tests were ordered.    2.  PHYSICAL THERAPY: Patient completed 6 sessions of physical therapy February 16, 2022.    I had referred him to [email or web address] physical therapy in May but this was never scheduled.  It sounds like he did not have a consistent primary care provider at that time.  He states that he is now ready to begin the [facility] physical therapy program.  A referral was entered.    3.  MEDICATIONS:  - Patient has been taking Advil 400 mg daily as needed.  This is helpful but he has to take Pepcid for GERD.  I recommended that he try Tylenol instead of Advil.  I prescribed Tylenol 500 to 1000 mg 3 times daily as needed.  Hopefully this helps and he has GERD symptoms.  -Patient did not tolerate gabapentin 300 mg due to side effects including fatigue, nausea, excessive thirst.  He should stop this medication.    4.  INTERVENTIONS: No interventions were ordered.  Patient would like to trial physical therapy first.    5.  PATIENT EDUCATION: Patient is in agreement the above plan.  All questions were answered.    6.   FOLLOW-UP: Patient to follow-up with me in 2 months to monitor progress with [facility] physical therapy.  If he has questions or concerns in the meantime, he should not hesitate to call.    Subjective:     Francisco Desir is a 47 year old male who presents today for follow-up regarding low back pain.  I last saw this patient May 26, 2022.  At that visit I referred him to [facility] therapy.  Patient reports this was never scheduled.  He states that he did not have a consistent primary care provider at that time.  He wanted to wait until he had a consistent primary care provider to begin the program.  He states that he now has a primary care provider and he would like a referral.  He feels like his pain is slightly worse.    Patient complains of midline back pain.  Pain is located at the thoracolumbar junction.  Occasionally he feels pain just to the lateral of midline on both sides.  He denies any pain radiating in the buttocks or down the legs.  He has intermittent tingling in the right lower extremity only with prolonged sitting.  He feels this in the anterior ankle and dorsal foot.  Denies weakness.  He rates his pain today as a 6 or 7 out of 10.  At its worst it is a 9 out of 10.  At its best it is a 5-10.  Pain is aggravated heavy lifting, twisting, lifting overhead.  Pain is alleviated with lying down for 30 minutes.  He denies any new symptoms since he was last seen.    Patient completed 6 sessions of physical therapy February 16, 2022.  He takes Advil 400 mg daily as needed.  He takes this 2 or 3 times a week only when pain is more severe.  He is not taking any additional pain relieving medications.  The Advil is somewhat low.    Review of Systems:  Positive for intermittent tingling.  Negative for weakness, loss of bowel/bladder control, inability to urinate, headache, pain much worse at night, trip/stumble/falls medical D swallowing, difficulty with hand skills, fevers, unintentional weight loss.      Objective:   CONSTITUTIONAL:  Vital signs as above.  No acute distress.  The patient is well nourished and well groomed.    PSYCHIATRIC:  The patient is awake, alert, oriented to person, place and time.  The patient is answering questions appropriately with clear speech.  Normal affect.  HEENT: Normocephalic, atraumatic.  Sclera clear.    SKIN:  Skin over the face, posterior torso, bilateral upper and lower extremities is clean, dry, intact without rashes.  VASCULAR: No significant lower extremity edema.  MUSCULOSKELETAL:  Gait is non-antalgic.  The patient is able to heel and toe walk without any difficulty.  Tender to palpation in the midline at T12-L1.  Thoracolumbar flexion and extension is full.  The patient has 5/5 strength for the bilateral hip flexors, knee flexors/extensors, ankle dorsiflexors/plantar flexors, ankle evertors/invertors.    NEUROLOGICAL: 1-2+ patellar, achilles reflexes which are symmetric bilaterally.  No ankle clonus bilaterally.  Sensation to light touch is intact in the bilateral L4, L5, and S1 dermatomes.       RESULTS: I reviewed the MRI lumbar spine from [facility] dated May 16, 2022.  At L4-5 there is a central annular tear but no neural compromise.  At L5-S1 there is a right paracentral annular tear but no neural compromise.

## 2022-09-22 ENCOUNTER — OFFICE VISIT (OUTPATIENT)
Dept: PHYSICAL MEDICINE AND REHAB | Facility: CLINIC | Age: 47
End: 2022-09-22
Payer: COMMERCIAL

## 2022-09-22 VITALS — DIASTOLIC BLOOD PRESSURE: 86 MMHG | SYSTOLIC BLOOD PRESSURE: 135 MMHG | HEART RATE: 79 BPM | OXYGEN SATURATION: 96 %

## 2022-09-22 DIAGNOSIS — M54.50 LUMBAR SPINE PAIN: Primary | ICD-10-CM

## 2022-09-22 DIAGNOSIS — M54.16 LUMBAR RADICULITIS: ICD-10-CM

## 2022-09-22 DIAGNOSIS — M51.369 DDD (DEGENERATIVE DISC DISEASE), LUMBAR: ICD-10-CM

## 2022-09-22 PROCEDURE — 99214 OFFICE O/P EST MOD 30 MIN: CPT | Performed by: PHYSICIAN ASSISTANT

## 2022-09-22 RX ORDER — ACETAMINOPHEN 500 MG
500-1000 TABLET ORAL EVERY 8 HOURS PRN
Qty: 100 TABLET | Refills: 1 | Status: SHIPPED | OUTPATIENT
Start: 2022-09-22

## 2022-09-22 ASSESSMENT — PAIN SCALES - GENERAL: PAINLEVEL: SEVERE PAIN (7)

## 2022-09-22 NOTE — LETTER
9/22/2022         RE: Francisco Desir  [address] [place] [address]        Dear Colleague,    Thank you for referring your patient, Francisco Desir, to the [facility]. Please see a copy of my visit note below.    Assessment:   Francisco Desir is a 47 year old y.o. male with past medical history significant for asthma, GERD who presents today for follow-up regarding chronic low back pain.  Pain is in the midline at the thoracolumbar junction.  He has intermittent tingling in the right dorsal foot and anterior ankle only with prolonged sitting.  Otherwise he does not have any lower extremity symptoms.  My review of an MRI lumbar spine shows a right paracentral annular tear at L5-S1 but no significant neural compromise.  There is nothing abnormal on his MRI near the thoracolumbar junction.       Plan:     A shared decision making plan was used.  The patient's values and choices were respected.  The following represents what was discussed and decided upon by the physician assistant and the patient.  A telephone  was used for the visit.    1.  DIAGNOSTIC TESTS: I reviewed the MRI lumbar spine.  No further diagnostic tests were ordered.    2.  PHYSICAL THERAPY: Patient completed 6 sessions of physical therapy February 16, 2022.    I had referred him to [facility] in May but this was never scheduled.  It sounds like he did not have a consistent primary care provider at that time.  He states that he is now ready to begin the [facility] physical therapy program.  A referral was entered.    3.  MEDICATIONS:  - Patient has been taking Advil 400 mg daily as needed.  This is helpful but he has to take Pepcid for GERD.  I recommended that he try Tylenol instead of Advil.  I prescribed Tylenol 500 to 1000 mg 3 times daily as needed.  Hopefully this helps and he has GERD symptoms.  -Patient did not tolerate gabapentin 300 mg due to side effects including fatigue, nausea,  excessive thirst.  He should stop this medication.    4.  INTERVENTIONS: No interventions were ordered.  Patient would like to trial physical therapy first.    5.  PATIENT EDUCATION: Patient is in agreement the above plan.  All questions were answered.    6.  FOLLOW-UP: Patient to follow-up with me in 2 months to monitor progress with [facility] physical therapy.  If he has questions or concerns in the meantime, he should not hesitate to call.    Subjective:     Francisco Desir is a 47 year old male who presents today for follow-up regarding low back pain.  I last saw this patient May 26, 2022.  At that visit I referred him to [facility] physical therapy.  Patient reports this was never scheduled.  He states that he did not have a consistent primary care provider at that time.  He wanted to wait until he had a consistent primary care provider to begin the program.  He states that he now has a primary care provider and he would like a referral.  He feels like his pain is slightly worse.    Patient complains of midline back pain.  Pain is located at the thoracolumbar junction.  Occasionally he feels pain just to the lateral of midline on both sides.  He denies any pain radiating in the buttocks or down the legs.  He has intermittent tingling in the right lower extremity only with prolonged sitting.  He feels this in the anterior ankle and dorsal foot.  Denies weakness.  He rates his pain today as a 6 or 7 out of 10.  At its worst it is a 9 out of 10.  At its best it is a 5-10.  Pain is aggravated heavy lifting, twisting, lifting overhead.  Pain is alleviated with lying down for 30 minutes.  He denies any new symptoms since he was last seen.    Patient completed 6 sessions of physical therapy February 16, 2022.  He takes Advil 400 mg daily as needed.  He takes this 2 or 3 times a week only when pain is more severe.  He is not taking any additional pain relieving medications.  The Advil is somewhat low.    Review of  Systems:  Positive for intermittent tingling.  Negative for weakness, loss of bowel/bladder control, inability to urinate, headache, pain much worse at night, trip/stumble/falls medical D swallowing, difficulty with hand skills, fevers, unintentional weight loss.     Objective:   CONSTITUTIONAL:  Vital signs as above.  No acute distress.  The patient is well nourished and well groomed.    PSYCHIATRIC:  The patient is awake, alert, oriented to person, place and time.  The patient is answering questions appropriately with clear speech.  Normal affect.  HEENT: Normocephalic, atraumatic.  Sclera clear.    SKIN:  Skin over the face, posterior torso, bilateral upper and lower extremities is clean, dry, intact without rashes.  VASCULAR: No significant lower extremity edema.  MUSCULOSKELETAL:  Gait is non-antalgic.  The patient is able to heel and toe walk without any difficulty.  Tender to palpation in the midline at T12-L1.  Thoracolumbar flexion and extension is full.  The patient has 5/5 strength for the bilateral hip flexors, knee flexors/extensors, ankle dorsiflexors/plantar flexors, ankle evertors/invertors.    NEUROLOGICAL: 1-2+ patellar, achilles reflexes which are symmetric bilaterally.  No ankle clonus bilaterally.  Sensation to light touch is intact in the bilateral L4, L5, and S1 dermatomes.       RESULTS: I reviewed the MRI lumbar spine from [facility] dated May 16, 2022.  At L4-5 there is a central annular tear but no neural compromise.  At L5-S1 there is a right paracentral annular tear but no neural compromise.          Again, thank you for allowing me to participate in the care of your patient.        Sincerely,        Chelsey Vargas PA-C

## 2022-09-22 NOTE — PATIENT INSTRUCTIONS
An order for physicaltherapy has been provided today.  Someone will call you to schedule physical therapy or you can call [phone] to schedule physical therapy.  It will be very important for you to do your physical therapy exercises on aregular basis to decrease your pain and prevent future flares of pain.

## 2022-11-10 ENCOUNTER — HOSPITAL ENCOUNTER (OUTPATIENT)
Dept: PHYSICAL THERAPY | Facility: CLINIC | Age: 47
Discharge: HOME | End: 2022-11-10
Attending: PHYSICIAN ASSISTANT
Payer: COMMERCIAL

## 2022-11-10 DIAGNOSIS — M54.16 LUMBAR RADICULITIS: ICD-10-CM

## 2022-11-10 DIAGNOSIS — M54.50 LUMBAR SPINE PAIN: ICD-10-CM

## 2022-11-10 PROCEDURE — 97161 PT EVAL LOW COMPLEX 20 MIN: CPT | Mod: GP

## 2022-11-10 PROCEDURE — 97110 THERAPEUTIC EXERCISES: CPT | Mod: GP

## 2022-11-10 NOTE — PROGRESS NOTES
Lumbar [facility] Initial testing    AROM (full=  0-72  lumbar) 0-36   Max Extension Torque  177   Flex: ext ratio (ideal 1.4:1) 2.27:1       Cervical [facility] Initial testing   AROM (full= 0-120  cervical)    Max Extension Torque     Flex: ext ratio (ideal 1.4:1)      Date 11/10/22   Lumbar Parameters    Top Dead Center (TDC) 18   Counterbalance (CB) 281   Seat Pad 1   Femur Restraint 5   Week/Visit    Enter Week/Visit # Wk 1 V 1    Weight (lbs) 177# (max)   Reps (#) --   Time --   ROM (degrees) 0-36   Pain some   Flex:Ext ratio 2.27:1   Cervical Parameters    Top Dead Center (TDC)    Counterbalance (CB)    Seat Height    Week/Visit    Enter Week/Visit #    Weight (lbs)    Reps (#)    Time    ROM (degrees)    Pain    Flex:Ext ratio      Date 11/10/22   Exercise    Prone on elbows X 1 min hold   Supine bridge X 10 B   Supine pretzel stretch X 30 sec holds                                         Scott Parmer, PT, DPT

## 2022-11-11 NOTE — PROGRESS NOTES
[facility]    OUTPATIENT PHYSICAL THERAPY ORTHOPEDIC EVALUATION  PLAN OF TREATMENT FOR OUTPATIENT REHABILITATION  (COMPLETE FOR INITIAL CLAIMS ONLY)  Patient's Last Name, First Name, M.I.  YOB: 1975  Francisco Desir    Provider s Name:  [facility]   Medical Record No.  [id number]   Start of Care Date:  11/10/22   Onset Date:  09/22/22   Type:     _X__PT   ___OT   ___SLP Medical Diagnosis:  Lumbar radiculitis     PT Diagnosis:  B low back pain; core and hip weakness; decreased lumbar ROM   Visits from SOC:  1      _________________________________________________________________________________  Plan of Treatment/Functional Goals:  ADL retraining, balance training, gait training, joint mobilization, manual therapy, neuromuscular re-education, ROM, strengthening, stretching     Biofeedback, Cryotherapy, Electrical stimulation, Hot packs, TENS, Traction, Ultrasound     Goals  Goal Identifier: DAVID  Goal Description: Patient will improve DAVID by at least 10% to show improvement in function.  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  Target Date: 01/05/23    Goal Identifier: Bending  Goal Description: Patient will demonstrate full bending ROM with no pain to return to PLOF and improve functional mobility.  Target Date: 01/05/23                  Therapy Frequency:  2 times/Week  Predicted Duration of Therapy Intervention:  8 weeks    Scott Parmer, PT                 I CERTIFY THE NEED FOR THESE SERVICES FURNISHED UNDER        THIS PLAN OF TREATMENT AND WHILE UNDER MY CARE     (Physician co-signature of this document indicates review and certification of the therapy plan).                     Certification Date From:  11/10/22    Certification Date To:  01/05/23    Referring Provider:  Chelsey Vargas PA-C    Initial Assessment        See Epic Evaluation Start of Care Date: 11/10/22                11/10/22 1500   General Information   Type of Visit Initial OP Ortho PT Evaluation   Start of Care Date 11/10/22   Referring Physician Chelsey Vargas PA-C   Orders Evaluate and Treat   Date of Order 09/22/22   Certification Required? Yes   Medical Diagnosis M54.50 (ICD-10-CM) - Lumbar spine pain   Surgical/Medical history reviewed Yes   Precautions/Limitations no known precautions/limitations   Body Part(s)   Body Part(s) Hip;Lumbar Spine/SI   Presentation and Etiology   Pertinent history of current problem (include personal factors and/or comorbidities that impact the POC) Patient reports chronic low back pain which started about 10 years ago. He reports a potential lifting injury while doing laundry. He has had PT in the past, which he states has not been as helpful. Functionally, he has difficulty with prolonged positioning, lifting, bending, twisting. He also has difficulty with jogging for exercise. He reports intermittent tingling when his symptoms are bad as well.   Impairments D. Decreased ROM;E. Decreased flexibility;A. Pain;F. Decreased strength and endurance   Functional Limitations perform activities of daily living;perform required work activities   Symptom Location B low back   How/Where did it occur While lifting   Onset date of current episode/exacerbation 09/22/22   Chronicity Chronic   Pain rating (0-10 point scale) Best (/10);Worst (/10)   Best (/10) 5   Pain quality B. Dull;C. Aching;A. Sharp   Frequency of pain/symptoms C. With activity   Pain/symptoms are: Worse during the day   Pain/symptoms exacerbated by A. Sitting;C. Lifting;B. Walking;G. Certain positions;I. Bending;J. ADL;K. Home tasks;L. Work tasks   Pain/symptoms eased by E. Changing positions;C. Rest;I. OTC medication(s)   Progression of symptoms since onset: Unchanged    Fall Risk Screen   Fall screen completed by PT   Have you fallen 2 or more times in the past year? No   Have you fallen and had an injury in the past year? No   Is patient a fall risk? No   Abuse Screen (yes response referral indicated)   Feels Unsafe at Home or Work/School no   Feels Threatened by Someone no   Does Anyone Try to Keep You From Having Contact with Others or Doing Things Outside Your Home? no   Physical Signs of Abuse Present no   System Outcome Measures   Outcome Measures Low Back Pain (see Oswestry and Kenyatta)  (32%)   Lumbar Spine/SI Objective Findings   Balance/Proprioception (Single Leg Stance) normal B   Hamstring Flexibility Limited L>R   Piriformis Flexibility Limited B, L>R   Flexion ROM 8 cm fingertip to floor   Extension ROM mod loss with pain   Right Side Bending ROM fingertip to mid thigh   Left Side Bending ROM fingertip to mid thigh   Pelvic Screen Anteriorly rotated R innominate   Hip Flexion (L2) Strength 5/5   Hip Abduction Strength 4/5   Hip Extension Strength 5/5   Knee Flexion Strength 5/5   Knee Extension (L3) Strength 5/5   Ankle Dorsiflexion (L4) Strength 5/5   Great Toe Extension (L5) Strength 5/5   Ankle Plantar Flexion (S1) Strength 5/5   SLR 75 deg L; 90 deg R   Segmental Mobility hypomobile throughout lumbar spine   Palpation Tender lower B lumbar paraspinals; QL, hip rotators   Slump Test back pain with both sides   Posture Mild anterior pelvic tilt   Planned Therapy Interventions   Planned Therapy Interventions ADL retraining;balance training;gait training;joint mobilization;manual therapy;neuromuscular re-education;ROM;strengthening;stretching   Planned Modality Interventions   Planned Modality Interventions Biofeedback;Cryotherapy;Electrical stimulation;Hot packs;TENS;Traction;Ultrasound   Clinical Impression   Criteria for Skilled Therapeutic Interventions Met yes, treatment indicated   PT Diagnosis B low back pain; core and hip weakness; decreased lumbar ROM    Functional limitations due to impairments bending, twisting, lifting, prolonged positioning   Clinical Presentation Stable/Uncomplicated   Clinical Decision Making (Complexity) Low complexity   Therapy Frequency 2 times/Week   Predicted Duration of Therapy Intervention (days/wks) 8 weeks   Risk & Benefits of therapy have been explained Yes   Patient, Family & other staff in agreement with plan of care Yes   Clinical Impression Comments Patient is a 48 yo male presenting to PT with chronic low back pain. On exam, he has considerable core weakness and poor low back strength. 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 He would benefit from skilled PT services to address limitations noted in the IE.   ORTHO GOALS   PT Ortho Eval Goals 1;2;3;4   Ortho Goal 1   Goal Identifier DAVID   Goal Description Patient will improve DAVID by at least 10% to show improvement in function.   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   Target Date 01/05/23   Ortho Goal 3   Goal Identifier Bending   Goal Description Patient will demonstrate full bending ROM with no pain to return to PLOF and improve functional mobility.   Target Date 01/05/23   Total Evaluation Time   PT Eval, Low Complexity Minutes (96082) 25   Therapy Certification   Certification date from 11/10/22   Certification date to 01/05/23   Medical Diagnosis Lumbar radiculitis     Scott Parmer, PT

## 2022-11-14 ENCOUNTER — HOSPITAL ENCOUNTER (OUTPATIENT)
Dept: PHYSICAL THERAPY | Facility: CLINIC | Age: 47
Discharge: HOME | End: 2022-11-14
Payer: COMMERCIAL

## 2022-11-14 DIAGNOSIS — G89.29 CHRONIC MIDLINE LOW BACK PAIN WITHOUT SCIATICA: ICD-10-CM

## 2022-11-14 DIAGNOSIS — M54.50 CHRONIC MIDLINE LOW BACK PAIN WITHOUT SCIATICA: ICD-10-CM

## 2022-11-14 DIAGNOSIS — M54.50 LUMBAR SPINE PAIN: Primary | ICD-10-CM

## 2022-11-14 DIAGNOSIS — R19.8 ABDOMINAL WEAKNESS: ICD-10-CM

## 2022-11-14 DIAGNOSIS — M54.16 LUMBAR RADICULITIS: ICD-10-CM

## 2022-11-14 PROCEDURE — 97110 THERAPEUTIC EXERCISES: CPT | Mod: GP

## 2022-11-14 PROCEDURE — 97140 MANUAL THERAPY 1/> REGIONS: CPT | Mod: GP

## 2022-11-14 NOTE — PROGRESS NOTES
Subjective  Pain is about the same since last week. Has been compliant with the exercises since last week.     Assessment  Francisco presents to PT for first follow up visit. No change in symptoms since initial visit. Has been compliant with HEP thus far. Tolerated medx DE well today. Manual therapy for symptom management. He remains appropriate for skilled PT services.     Lumbar [facility] Initial testing    AROM (full=  0-72  lumbar) 0-36   Max Extension Torque  177   Flex: ext ratio (ideal 1.4:1) 2.27:1         Date 11/14/22 11/10/22   Lumbar Parameters     Top Dead Center (TDC) 18 18   Counterbalance (CB) 281 281   Seat Pad 1 1   Femur Restraint 5 5   Week/Visit     Enter Week/Visit # Wk 1 V 2 Wk 1 V 1    Weight (lbs) 80# 177# (max)   Reps (#) 14 --   Time 120s --   ROM (degrees) 0-36 0-36   Pain some some   Flex:Ext ratio  2.27:1   Cervical Parameters     Top Dead Center (TDC)     Counterbalance (CB)     Seat Height     Week/Visit     Enter Week/Visit #     Weight (lbs)     Reps (#)     Time     ROM (degrees)     Pain     Flex:Ext ratio       Date 11/14/22 11/10/22   Exercise     Prone on elbows  X 1 min hold   Supine bridge  X 10 B   Supine pretzel stretch  X 30 sec holds   Rotary Torso 28# to L    NuStep X 4 min                                              Scott Parmer, PT, DPT

## 2022-11-17 ENCOUNTER — HOSPITAL ENCOUNTER (OUTPATIENT)
Dept: PHYSICAL THERAPY | Facility: CLINIC | Age: 47
Discharge: HOME | End: 2022-11-17
Payer: COMMERCIAL

## 2022-11-17 DIAGNOSIS — M54.50 CHRONIC MIDLINE LOW BACK PAIN WITHOUT SCIATICA: ICD-10-CM

## 2022-11-17 DIAGNOSIS — G89.29 CHRONIC MIDLINE LOW BACK PAIN WITHOUT SCIATICA: ICD-10-CM

## 2022-11-17 DIAGNOSIS — M54.50 LUMBAR SPINE PAIN: Primary | ICD-10-CM

## 2022-11-17 DIAGNOSIS — R19.8 ABDOMINAL WEAKNESS: ICD-10-CM

## 2022-11-17 DIAGNOSIS — M54.16 LUMBAR RADICULITIS: ICD-10-CM

## 2022-11-17 PROCEDURE — 97140 MANUAL THERAPY 1/> REGIONS: CPT | Mod: GP

## 2022-11-17 PROCEDURE — 97110 THERAPEUTIC EXERCISES: CPT | Mod: GP

## 2022-11-17 NOTE — PROGRESS NOTES
Subjective  Pain is about the same since Monday. Felt a little sore and tight after the medx exercise but this improved over the course of the week.    Assessment  Francisco presents to PT for second follow up visit. No change in symptoms since initial visit. Has been compliant with HEP thus far. Tolerated medx DE well today. Manual therapy for symptom management. He remains appropriate for skilled PT services.     Lumbar [facility] Initial testing    AROM (full=  0-72  lumbar) 0-36   Max Extension Torque  177   Flex: ext ratio (ideal 1.4:1) 2.27:1         Date 11/17/22 11/14/22 11/10/22   Lumbar Parameters      Top Dead Center (TDC) 18 18 18   Counterbalance (CB) 281 281 281   Seat Pad 1 1 1   Femur Restraint 5 5 5   Week/Visit      Enter Week/Visit # Wk 2 V 1 Wk 1 V 2 Wk 1 V 1    Weight (lbs) 82#  80# 177# (max)   Reps (#) 25 14 --   Time 190s 120s --   ROM (degrees) 0-36 0-36 0-36   Pain some some some   Flex:Ext ratio   2.27:1   Cervical Parameters      Top Dead Center (TDC)      Counterbalance (CB)      Seat Height      Week/Visit      Enter Week/Visit #      Weight (lbs)      Reps (#)      Time      ROM (degrees)      Pain      Flex:Ext ratio        Date 11/17/22 11/14/22 11/10/22   Exercise      Prone on elbows   X 1 min hold   Supine bridge   X 10 B   Supine pretzel stretch   X 30 sec holds   Rotary Torso 30# to L 28# to L    NuStep 7 min X 4 min                                                      Scott Parmer, PT, DPT

## 2022-11-21 ENCOUNTER — HOSPITAL ENCOUNTER (OUTPATIENT)
Dept: PHYSICAL THERAPY | Facility: CLINIC | Age: 47
Discharge: HOME | End: 2022-11-21
Payer: COMMERCIAL

## 2022-11-21 DIAGNOSIS — R19.8 ABDOMINAL WEAKNESS: ICD-10-CM

## 2022-11-21 DIAGNOSIS — M54.50 LUMBAR SPINE PAIN: Primary | ICD-10-CM

## 2022-11-21 DIAGNOSIS — G89.29 CHRONIC MIDLINE LOW BACK PAIN WITHOUT SCIATICA: ICD-10-CM

## 2022-11-21 DIAGNOSIS — M54.50 CHRONIC MIDLINE LOW BACK PAIN WITHOUT SCIATICA: ICD-10-CM

## 2022-11-21 DIAGNOSIS — M54.16 LUMBAR RADICULITIS: ICD-10-CM

## 2022-11-21 PROCEDURE — 97110 THERAPEUTIC EXERCISES: CPT | Mod: GP

## 2022-11-21 NOTE — PROGRESS NOTES
Assessment  Francisco presents to PT for third follow up visit. No change in symptoms since initial visit. Has been compliant with HEP thus far. Tolerated medx DE well today. Progression of HEP today which he tolerated well. He remains appropriate for skilled PT services.     Lumbar [facility] Initial testing    AROM (full=  0-72  lumbar) 0-36   Max Extension Torque  177   Flex: ext ratio (ideal 1.4:1) 2.27:1         Date 11/21/22 11/17/22 11/14/22 11/10/22   Lumbar Parameters       Top Dead Center (TDC) 18 18 18 18   Counterbalance (CB) 281 281 281 281   Seat Pad 1 1 1 1   Femur Restraint 5 5 5 5   Week/Visit       Enter Week/Visit # Wk 2 V 2 Wk 2 V 1 Wk 1 V 2 Wk 1 V 1    Weight (lbs) 85# 82#  80# 177# (max)   Reps (#) 30 25 14 --   Time 182s 190s 120s --   ROM (degrees) 0-36 0-36 0-36 0-36   Pain  some some some   Flex:Ext ratio    2.27:1   Cervical Parameters       Top Dead Center (TDC)       Counterbalance (CB)       Seat Height       Week/Visit       Enter Week/Visit #       Weight (lbs)       Reps (#)       Time       ROM (degrees)       Pain       Flex:Ext ratio         Date 11/21/22 11/17/22 11/14/22 11/10/22   Exercise       Prone on elbows    X 1 min hold   Supine bridge    X 10 B   Supine pretzel stretch    X 30 sec holds   Rotary Torso 32# to L 30# to L 28# to L    NuStep 6 min 7 min X 4 min    Supine LTRs X 10 each way      S/l clamshell X 10 each way green band Scott Parmer, PT, DPT

## 2022-11-22 NOTE — PROGRESS NOTES
Assessment:   Francisco Desir is a 47 year old y.o. male with past medical history significant for asthma, GERD who presents today for follow-up regarding 2 areas of concern.  1.  Chronic low back pain.  Pain is in the midline at L2.  He has intermittent tingling in the right dorsal foot and anterior ankle only with prolonged sitting.  Otherwise he does not have any lower extremity symptoms.  My review of an MRI lumbar spine shows minor disc degeneration at L2-3.  There is no spinal canal or neuroforaminal stenosis.  There is no facet arthropathy.  Question of pain may be discogenic.  2.  Chronic back and left neck pain without radicular symptoms.  Patient has not had any cervical spine imaging.       Plan:     A shared decision making plan was used.  The patient's values and choices were respected.  The following represents what was discussed and decided upon by the physician assistant and the patient.  A telephone  was used for the visit.    1.  DIAGNOSTIC TESTS: I reviewed the MRI lumbar spine.  - I ordered an x-ray cervical spine as an initial evaluation.    2.  PHYSICAL THERAPY:  - Patient completed 6 sessions of traditional physical therapy February 16, 2022.    - The patient is now doing [facility] physical therapy.  He has had 4 sessions so far.  - I entered a new referral so that the physical therapist can also address his chronic neck pain.    3.  MEDICATIONS:  - I prescribed Voltaren gel.  - Patient can continue using Tylenol as needed.  He takes this 2-3 times per week.  It is helpful.  -Patient did not tolerate gabapentin 300 mg due to side effects including fatigue, nausea, excessive thirst.  He should stop this medication.  - Patient indicated he does not want to try any medications that are sedating.    4.  INTERVENTIONS:  - We discussed a bilateral L2-3 transforaminal epidural steroid injection.  I told the patient I cannot guarantee this would help with his back pain since it is typically more  effective for leg pain, but his back pain has been refractory to conservative treatment including extensive physical therapy and medical management.  He is going to consider this.    5.  PATIENT EDUCATION: Patient is in agreement the above plan.  All questions were answered.    6.  FOLLOW-UP: Patient is going to follow-up with me in about 2 months to monitor progress with physical therapy.  If he has questions or concerns in the meantime, he should not hesitate to call.      Subjective:     Francisco Desir is a 47 year old male who presents today for follow-up regarding low back pain.  I last saw this patient September 22, 2022.  I recommended physical therapy.  He is doing the [facility] program.  He has had 4 session so far.  Patient reports No improvement in his pain.    Patient complains of midline back pain.  Pain is located at L2.  He denies any pain down the legs.  He does have intermittent numbness and tingling in the right leg/foot with prolonged sitting.  Denies weakness.    Patient also brings forward a new complaint today of chronic neck pain.  He states that he has had right greater than left neck pain for 2 to 3 years.  He gets headaches associated with the neck pain.  He states that his neck muscles feel very tense.  Denies pain down the arms.  Denies numbness, tingling, weakness down the arms.  He states his neck pain is worse with repetitive use of his arms.  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    Patient is currently in [facility] physical therapy.  He has had 4 sessions.  He does his home exercises.  He takes Tylenol 2-3 times per week which is helpful.    Review of Systems:   Negative for numbness/tingling, weakness, loss of bowel/bladder control, inability to urinate, headache, pain much worse at night, trip/stumble/falls medical D swallowing, difficulty with hand skills, fevers, unintentional weight loss.     Objective:   CONSTITUTIONAL:  Vital signs as above.  No  acute distress.  The patient is well nourished and well groomed.    PSYCHIATRIC:  The patient is awake, alert, oriented to person, place and time.  The patient is answering questions appropriately with clear speech.  Normal affect.  HEENT: Normocephalic, atraumatic.  Sclera clear.    SKIN:  Skin over the face, posterior torso, bilateral upper and lower extremities is clean, dry, intact without rashes.  VASCULAR: No significant lower extremity edema.  MUSCULOSKELETAL:  Gait is non-antalgic.  The patient is able to heel and toe walk without any difficulty.  Tender to palpation in the midline at L2.  Lumbar flexion mildly restricted with increased pain.  Lumbar extension full without pain.  The patient has 5/5 strength for the bilateral hip flexors, knee flexors/extensors, ankle dorsiflexors/plantar flexors, ankle evertors/invertors.   NEUROLOGICAL:  No ankle clonus bilaterally.  Sensation to light touch is intact in the bilateral L4, L5, and S1 dermatomes.       RESULTS: I reviewed the MRI lumbar spine from [facility] dated May 16, 2022.  At L4-5 there is a central annular tear but no neural compromise.  At L5-S1 there is a right paracentral annular tear but no neural compromise.

## 2022-11-23 ENCOUNTER — OFFICE VISIT (OUTPATIENT)
Dept: PHYSICAL MEDICINE AND REHAB | Facility: CLINIC | Age: 47
End: 2022-11-23
Payer: COMMERCIAL

## 2022-11-23 VITALS
HEIGHT: 65 IN | WEIGHT: 133 LBS | SYSTOLIC BLOOD PRESSURE: 136 MMHG | HEART RATE: 71 BPM | BODY MASS INDEX: 22.16 KG/M2 | DIASTOLIC BLOOD PRESSURE: 88 MMHG

## 2022-11-23 DIAGNOSIS — M54.50 LUMBAR SPINE PAIN: Primary | ICD-10-CM

## 2022-11-23 DIAGNOSIS — M54.2 CERVICALGIA: ICD-10-CM

## 2022-11-23 DIAGNOSIS — M51.369 DDD (DEGENERATIVE DISC DISEASE), LUMBAR: ICD-10-CM

## 2022-11-23 PROCEDURE — 99214 OFFICE O/P EST MOD 30 MIN: CPT | Performed by: PHYSICIAN ASSISTANT

## 2022-11-23 ASSESSMENT — PAIN SCALES - GENERAL: PAINLEVEL: MODERATE PAIN (5)

## 2022-11-23 NOTE — PATIENT INSTRUCTIONS
The procedure we talked about today is bilateral L2/3 epidural steroid injections.  If you would like to proceed with the injections please call our nurse line at [phone].    [facility] Scheduling    Please call [phone] to schedule your image(s) (select option#1). There are 2 different locations, see below. You can do walk-in visits for xray only images if you want.     [address] [address]    [address] [address]

## 2022-11-23 NOTE — LETTER
11/23/2022         RE: Francisco Desir  [address] [place] [address]        Dear Colleague,    Thank you for referring your patient, Francisco Desir, to the [facility]. Please see a copy of my visit note below.    Assessment:   Francisco Desir is a 47 year old y.o. male with past medical history significant for asthma, GERD who presents today for follow-up regarding 2 areas of concern.  1.  Chronic low back pain.  Pain is in the midline at L2.  He has intermittent tingling in the right dorsal foot and anterior ankle only with prolonged sitting.  Otherwise he does not have any lower extremity symptoms.  My review of an MRI lumbar spine shows minor disc degeneration at L2-3.  There is no spinal canal or neuroforaminal stenosis.  There is no facet arthropathy.  Question of pain may be discogenic.  2.  Chronic back and left neck pain without radicular symptoms.  Patient has not had any cervical spine imaging.       Plan:     A shared decision making plan was used.  The patient's values and choices were respected.  The following represents what was discussed and decided upon by the physician assistant and the patient.  A telephone  was used for the visit.    1.  DIAGNOSTIC TESTS: I reviewed the MRI lumbar spine.  - I ordered an x-ray cervical spine as an initial evaluation.    2.  PHYSICAL THERAPY:  - Patient completed 6 sessions of traditional physical therapy February 16, 2022.    - The patient is now doing [facility] physical therapy.  He has had 4 sessions so far.  - I entered a new referral so that the physical therapist can also address his chronic neck pain.    3.  MEDICATIONS:  - I prescribed Voltaren gel.  - Patient can continue using Tylenol as needed.  He takes this 2-3 times per week.  It is helpful.  -Patient did not tolerate gabapentin 300 mg due to side effects including fatigue, nausea, excessive thirst.  He should stop this medication.  - Patient indicated he  does not want to try any medications that are sedating.    4.  INTERVENTIONS:  - We discussed a bilateral L2-3 transforaminal epidural steroid injection.  I told the patient I cannot guarantee this would help with his back pain since it is typically more effective for leg pain, but his back pain has been refractory to conservative treatment including extensive physical therapy and medical management.  He is going to consider this.    5.  PATIENT EDUCATION: Patient is in agreement the above plan.  All questions were answered.    6.  FOLLOW-UP: Patient is going to follow-up with me in about 2 months to monitor progress with physical therapy.  If he has questions or concerns in the meantime, he should not hesitate to call.      Subjective:     Francisco Desir is a 47 year old male who presents today for follow-up regarding low back pain.  I last saw this patient September 22, 2022.  I recommended physical therapy.  He is doing the [facility] program.  He has had 4 session so far.  Patient reports No improvement in his pain.    Patient complains of midline back pain.  Pain is located at L2.  He denies any pain down the legs.  He does have intermittent numbness and tingling in the right leg/foot with prolonged sitting.  Denies weakness.    Patient also brings forward a new complaint today of chronic neck pain.  He states that he has had right greater than left neck pain for 2 to 3 years.  He gets headaches associated with the neck pain.  He states that his neck muscles feel very tense.  Denies pain down the arms.  Denies numbness, tingling, weakness down the arms.  He states his neck pain is worse with repetitive use of his arms.  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    Patient is currently in [facility] physical therapy.  He has had 4 sessions.  He does his home exercises.  He takes Tylenol 2-3 times per week which is helpful.    Review of Systems:   Negative for numbness/tingling, weakness,  loss of bowel/bladder control, inability to urinate, headache, pain much worse at night, trip/stumble/falls medical D swallowing, difficulty with hand skills, fevers, unintentional weight loss.     Objective:   CONSTITUTIONAL:  Vital signs as above.  No acute distress.  The patient is well nourished and well groomed.    PSYCHIATRIC:  The patient is awake, alert, oriented to person, place and time.  The patient is answering questions appropriately with clear speech.  Normal affect.  HEENT: Normocephalic, atraumatic.  Sclera clear.    SKIN:  Skin over the face, posterior torso, bilateral upper and lower extremities is clean, dry, intact without rashes.  VASCULAR: No significant lower extremity edema.  MUSCULOSKELETAL:  Gait is non-antalgic.  The patient is able to heel and toe walk without any difficulty.  Tender to palpation in the midline at L2.  Lumbar flexion mildly restricted with increased pain.  Lumbar extension full without pain.  The patient has 5/5 strength for the bilateral hip flexors, knee flexors/extensors, ankle dorsiflexors/plantar flexors, ankle evertors/invertors.   NEUROLOGICAL:  No ankle clonus bilaterally.  Sensation to light touch is intact in the bilateral L4, L5, and S1 dermatomes.       RESULTS: I reviewed the MRI lumbar spine from [facility] dated May 16, 2022.  At L4-5 there is a central annular tear but no neural compromise.  At L5-S1 there is a right paracentral annular tear but no neural compromise.          Again, thank you for allowing me to participate in the care of your patient.        Sincerely,        Chelsey Vargas PA-C

## 2022-11-28 ENCOUNTER — HOSPITAL ENCOUNTER (OUTPATIENT)
Dept: PHYSICAL THERAPY | Facility: CLINIC | Age: 47
Discharge: HOME | End: 2022-11-28
Payer: COMMERCIAL

## 2022-11-28 DIAGNOSIS — M54.50 LUMBAR SPINE PAIN: Primary | ICD-10-CM

## 2022-11-28 DIAGNOSIS — M54.50 CHRONIC MIDLINE LOW BACK PAIN WITHOUT SCIATICA: ICD-10-CM

## 2022-11-28 DIAGNOSIS — G89.29 CHRONIC MIDLINE LOW BACK PAIN WITHOUT SCIATICA: ICD-10-CM

## 2022-11-28 DIAGNOSIS — M54.16 LUMBAR RADICULITIS: ICD-10-CM

## 2022-11-28 DIAGNOSIS — R19.8 ABDOMINAL WEAKNESS: ICD-10-CM

## 2022-11-28 PROCEDURE — 97110 THERAPEUTIC EXERCISES: CPT | Mod: GP

## 2022-11-28 PROCEDURE — 97140 MANUAL THERAPY 1/> REGIONS: CPT | Mod: GP

## 2022-11-28 NOTE — PROGRESS NOTES
Assessment  Francisco presents to PT for third follow up visit. No change in symptoms since initial visit. Has been compliant with HEP thus far. Tolerated medx DE well today. Reported newer onset of neck pain and headaches relating to his work and posture so addressed this with manual therapy this date. He remains appropriate for skilled PT services.     Lumbar [facility] Initial testing    AROM (full=  0-72  lumbar) 0-36   Max Extension Torque  177   Flex: ext ratio (ideal 1.4:1) 2.27:1         Date 11/28/22 11/21/22 11/17/22 11/14/22 11/10/22   Lumbar Parameters        Top Dead Center (TDC) 18 18 18 18 18   Counterbalance (CB) 281 281 281 281 281   Seat Pad 1 1 1 1 1   Femur Restraint 5 5 5 5 5   Week/Visit        Enter Week/Visit # Wk 3 V 1  Wk 2 V 2 Wk 2 V 1 Wk 1 V 2 Wk 1 V 1    Weight (lbs) 88#  85# 82#  80# 177# (max)   Reps (#) 30 30 25 14 --   Time 175s 182s 190s 120s --   ROM (degrees) 0-36 0-36 0-36 0-36 0-36   Pain   some some some   Flex:Ext ratio     2.27:1   Cervical Parameters        Top Dead Center (TDC)        Counterbalance (CB)        Seat Height        Week/Visit        Enter Week/Visit #        Weight (lbs)        Reps (#)        Time        ROM (degrees)        Pain        Flex:Ext ratio          Date 11/28/22 11/21/22 11/17/22 11/14/22 11/10/22   Exercise        Prone on elbows     X 1 min hold   Supine bridge     X 10 B   Supine pretzel stretch     X 30 sec holds   Rotary Torso  32# to L 30# to L 28# to L    NuStep 6 min 6 min 7 min X 4 min    Supine LTRs  X 10 each way      S/l clamshell  X 10 each way green band Scott Parmer, PT, DPT

## 2022-12-01 ENCOUNTER — HOSPITAL ENCOUNTER (OUTPATIENT)
Dept: PHYSICAL THERAPY | Facility: CLINIC | Age: 47
Discharge: HOME | End: 2022-12-01
Payer: COMMERCIAL

## 2022-12-01 DIAGNOSIS — M54.16 LUMBAR RADICULITIS: ICD-10-CM

## 2022-12-01 DIAGNOSIS — G89.29 CHRONIC MIDLINE LOW BACK PAIN WITHOUT SCIATICA: ICD-10-CM

## 2022-12-01 DIAGNOSIS — M54.50 LUMBAR SPINE PAIN: Primary | ICD-10-CM

## 2022-12-01 DIAGNOSIS — R19.8 ABDOMINAL WEAKNESS: ICD-10-CM

## 2022-12-01 DIAGNOSIS — M54.50 CHRONIC MIDLINE LOW BACK PAIN WITHOUT SCIATICA: ICD-10-CM

## 2022-12-01 PROCEDURE — 97110 THERAPEUTIC EXERCISES: CPT | Mod: GP

## 2022-12-01 PROCEDURE — 97140 MANUAL THERAPY 1/> REGIONS: CPT | Mod: GP

## 2022-12-01 NOTE — PROGRESS NOTES
Assessment  Francisco presents to PT for 5th follow up visit. This week has been better with less pain at work. Has been compliant with HEP thus far. Tolerated medx DE well today. He remains appropriate for skilled PT services.     Lumbar [facility] Initial testing    AROM (full=  0-72  lumbar) 0-36   Max Extension Torque  177   Flex: ext ratio (ideal 1.4:1) 2.27:1         Date 12/1/22 11/28/22 11/21/22 11/17/22 11/14/22 11/10/22   Lumbar Parameters         Top Dead Center (TDC) 18 18 18 18 18 18   Counterbalance (CB) 281 281 281 281 281 281   Seat Pad 1 1 1 1 1 1   Femur Restraint 5 5 5 5 5 5   Week/Visit         Enter Week/Visit # Wk 3 V 2  Wk 3 V 1  Wk 2 V 2 Wk 2 V 1 Wk 1 V 2 Wk 1 V 1    Weight (lbs) 93#  88#  85# 82#  80# 177# (max)   Reps (#) 30 30 30 25 14 --   Time 162s 175s 182s 190s 120s --   ROM (degrees) 0-36 0-36 0-36 0-36 0-36 0-36   Pain    some some some   Flex:Ext ratio      2.27:1   Cervical Parameters         Top Dead Center (TDC)         Counterbalance (CB)         Seat Height         Week/Visit         Enter Week/Visit #         Weight (lbs)         Reps (#)         Time         ROM (degrees)         Pain         Flex:Ext ratio           Date 12/1/22 11/28/22 11/21/22 11/17/22 11/14/22 11/10/22   Exercise         Prone on elbows      X 1 min hold   Supine bridge      X 10 B   Supine pretzel stretch      X 30 sec holds   Rotary Torso 36# to L  32# to L 30# to L 28# to L    NuStep 4 min 6 min 6 min 7 min X 4 min    Supine LTRs   X 10 each way      S/l clamshell   X 10 each way green band                                                              Scott Parmer, PT, DPT

## 2022-12-05 ENCOUNTER — HOSPITAL ENCOUNTER (OUTPATIENT)
Dept: PHYSICAL THERAPY | Facility: CLINIC | Age: 47
Discharge: HOME | End: 2022-12-05
Payer: COMMERCIAL

## 2022-12-05 DIAGNOSIS — M54.50 CHRONIC MIDLINE LOW BACK PAIN WITHOUT SCIATICA: ICD-10-CM

## 2022-12-05 DIAGNOSIS — M54.16 LUMBAR RADICULITIS: ICD-10-CM

## 2022-12-05 DIAGNOSIS — M54.50 LUMBAR SPINE PAIN: Primary | ICD-10-CM

## 2022-12-05 DIAGNOSIS — G89.29 CHRONIC MIDLINE LOW BACK PAIN WITHOUT SCIATICA: ICD-10-CM

## 2022-12-05 DIAGNOSIS — R19.8 ABDOMINAL WEAKNESS: ICD-10-CM

## 2022-12-05 PROCEDURE — 97110 THERAPEUTIC EXERCISES: CPT | Mod: GP

## 2022-12-05 PROCEDURE — 97140 MANUAL THERAPY 1/> REGIONS: CPT | Mod: GP

## 2022-12-05 NOTE — PROGRESS NOTES
Assessment  Francisco presents to PT for 6th follow up visit. This week has been better with less pain at work. Has been compliant with HEP thus far. Tolerated medx DE well today. Re-tested lumbar medx as well and he has made great strength improvements. He is making great progress with PT. He remains appropriate for skilled PT services.     Lumbar [facility] Re-test  12/5/22 Initial testing    AROM (full=  0-72  lumbar) 0-36 0-36   Max Extension Torque  334 177   Flex: ext ratio (ideal 1.4:1) 1.62:1 2.27:1         Date 12/5/22 12/1/22 11/28/22 11/21/22 11/17/22 11/14/22 11/10/22   Lumbar Parameters          Top Dead Center (TDC) 18 18 18 18 18 18 18   Counterbalance (CB) 281 281 281 281 281 281 281   Seat Pad 1 1 1 1 1 1 1   Femur Restraint 5 5 5 5 5 5 5   Week/Visit          Enter Week/Visit # Wk 4 V 1  Wk 3 V 2  Wk 3 V 1  Wk 2 V 2 Wk 2 V 1 Wk 1 V 2 Wk 1 V 1    Weight (lbs) 97# 93#  88#  85# 82#  80# 177# (max)   Reps (#) 30 30 30 30 25 14 --   Time 140s 162s 175s 182s 190s 120s --   ROM (degrees) 0-36 0-36 0-36 0-36 0-36 0-36 0-36   Pain     some some some   Flex:Ext ratio       2.27:1     Date 12/5/22 12/1/22 11/28/22 11/21/22 11/17/22 11/14/22 11/10/22   Exercise          Prone on elbows       X 1 min hold   Supine bridge       X 10 B   Supine pretzel stretch       X 30 sec holds   Rotary Torso 40# to L 36# to L  32# to L 30# to L 28# to L    NuStep 4 min  4 min 6 min 6 min 7 min X 4 min    Supine LTRs    X 10 each way      S/l clamshell    X 10 each way green band                                                                    Scott Parmer, PT, DPT

## 2022-12-08 ENCOUNTER — HOSPITAL ENCOUNTER (OUTPATIENT)
Dept: PHYSICAL THERAPY | Facility: CLINIC | Age: 47
Discharge: HOME | End: 2022-12-08
Payer: COMMERCIAL

## 2022-12-08 DIAGNOSIS — M54.16 LUMBAR RADICULITIS: ICD-10-CM

## 2022-12-08 DIAGNOSIS — M54.50 CHRONIC MIDLINE LOW BACK PAIN WITHOUT SCIATICA: ICD-10-CM

## 2022-12-08 DIAGNOSIS — R19.8 ABDOMINAL WEAKNESS: ICD-10-CM

## 2022-12-08 DIAGNOSIS — G89.29 CHRONIC MIDLINE LOW BACK PAIN WITHOUT SCIATICA: ICD-10-CM

## 2022-12-08 DIAGNOSIS — M54.50 LUMBAR SPINE PAIN: Primary | ICD-10-CM

## 2022-12-08 PROCEDURE — 97140 MANUAL THERAPY 1/> REGIONS: CPT | Mod: GP

## 2022-12-08 PROCEDURE — 97110 THERAPEUTIC EXERCISES: CPT | Mod: GP

## 2022-12-08 NOTE — PROGRESS NOTES
Assessment  Francisco presents to PT for 7th follow up visit. This week has been better with less pain at work. Has been compliant with HEP thus far. Tolerated medx DE well today. More sore today, so focused on manual therapy. Overall, he is making great progress with PT. He remains appropriate for skilled PT services.     Lumbar [facility] Re-test  12/5/22 Initial testing    AROM (full=  0-72  lumbar) 0-36 0-36   Max Extension Torque  334 177   Flex: ext ratio (ideal 1.4:1) 1.62:1 2.27:1         Date 12/8/22 12/5/22 12/1/22 11/28/22 11/21/22 11/17/22 11/14/22 11/10/22   Lumbar Parameters           Top Dead Center (TDC) 18 18 18 18 18 18 18 18   Counterbalance (CB) 281 281 281 281 281 281 281 281   Seat Pad 1 1 1 1 1 1 1 1   Femur Restraint 5 5 5 5 5 5 5 5   Week/Visit           Enter Week/Visit # Wk 4 V 2  Wk 4 V 1  Wk 3 V 2  Wk 3 V 1  Wk 2 V 2 Wk 2 V 1 Wk 1 V 2 Wk 1 V 1    Weight (lbs) 102#  97# 93#  88#  85# 82#  80# 177# (max)   Reps (#) 22 30 30 30 30 25 14 --   Time 135s 140s 162s 175s 182s 190s 120s --   ROM (degrees) 0-36   0-36 0-36 0-36 0-36 0-36 0-36 0-36   Pain      some some some   Flex:Ext ratio        2.27:1     Date 12/8/22 12/5/22 12/1/22 11/28/22 11/21/22 11/17/22 11/14/22 11/10/22   Exercise           Prone on elbows        X 1 min hold   Supine bridge        X 10 B   Supine pretzel stretch        X 30 sec holds   Rotary Torso 42# to L 40# to L 36# to L  32# to L 30# to L 28# to L    NuStep 4 min 4 min  4 min 6 min 6 min 7 min X 4 min    Supine LTRs     X 10 each way      S/l clamshell     X 10 each way green band                                                                          Scott Parmer, PT, DPT

## 2022-12-19 ENCOUNTER — HOSPITAL ENCOUNTER (OUTPATIENT)
Dept: PHYSICAL THERAPY | Facility: CLINIC | Age: 47
Discharge: HOME | End: 2022-12-19
Payer: COMMERCIAL

## 2022-12-19 DIAGNOSIS — M54.50 CHRONIC MIDLINE LOW BACK PAIN WITHOUT SCIATICA: ICD-10-CM

## 2022-12-19 DIAGNOSIS — M54.50 LUMBAR SPINE PAIN: Primary | ICD-10-CM

## 2022-12-19 DIAGNOSIS — G89.29 CHRONIC MIDLINE LOW BACK PAIN WITHOUT SCIATICA: ICD-10-CM

## 2022-12-19 DIAGNOSIS — R19.8 ABDOMINAL WEAKNESS: ICD-10-CM

## 2022-12-19 DIAGNOSIS — M54.16 LUMBAR RADICULITIS: ICD-10-CM

## 2022-12-19 PROCEDURE — 97110 THERAPEUTIC EXERCISES: CPT | Mod: GP | Performed by: PHYSICAL THERAPIST

## 2022-12-19 NOTE — PROGRESS NOTES
Subjective:  Doing well today, no pain. Feels that things overall have been fine.  Had a flare after last time, but better now, hoping to lighten weight, going on vacation, will return after that if needed.    Assessment  Francisco presents to PT for 8th follow up visit. Overall doing well with pain and ability to work, lightened weight today due to flare after last time with higher weight. Patient feels he has met his goals and will trial independent management. Shortened session due to late start today. Patient will return if needed after vacation.       Lumbar [facility] Re-test  12/5/22 Initial testing    AROM (full=  0-72  lumbar) 0-36 0-36   Max Extension Torque  334 177   Flex: ext ratio (ideal 1.4:1) 1.62:1 2.27:1         Date 12/19/2022 12/8/22 12/5/22 12/1/22 11/28/22 11/21/22 11/17/22 11/14/22 11/10/22   Lumbar Parameters            Top Dead Center (TDC) 18 18 18 18 18 18 18 18 18   Counterbalance (CB) 281 281 281 281 281 281 281 281 281   Seat Pad 1 1 1 1 1 1 1 1 1   Femur Restraint 5 5 5 5 5 5 5 5 5   Week/Visit            Enter Week/Visit # Wk 5 V1 Wk 4 V 2  Wk 4 V 1  Wk 3 V 2  Wk 3 V 1  Wk 2 V 2 Wk 2 V 1 Wk 1 V 2 Wk 1 V 1    Weight (lbs) 97# 102#  97# 93#  88#  85# 82#  80# 177# (max)   Reps (#) 20 22 30 30 30 30 25 14 --   Time 108 135s 140s 162s 175s 182s 190s 120s --   ROM (degrees) 0-36 0-36   0-36 0-36 0-36 0-36 0-36 0-36 0-36   Pain       some some some   Flex:Ext ratio         2.27:1     Date 12/19/2022 12/8/22 12/5/22 12/1/22 11/28/22 11/21/22 11/17/22 11/14/22 11/10/22   Exercise            Prone on elbows         X 1 min hold   Supine bridge         X 10 B   Supine pretzel stretch         X 30 sec holds   Rotary Torso 40# to R 42# to L 40# to L 36# to L  32# to L 30# to L 28# to L    NuStep  4 min 4 min  4 min 6 min 6 min 7 min X 4 min    Supine LTRs      X 10 each way      S/l clamshell      X 10 each way green band                                                                                 Gallito Gonzáles, PT

## 2023-02-16 NOTE — PROGRESS NOTES
[facility]    Outpatient Physical Therapy Discharge Note  Patient: Francisco Desir  : 1975    Beginning/End Dates of Reporting Period:  11/10/22 to 22    Referring Provider: Chelsey Vargas PA-C    Therapy Diagnosis: Lumbar radiculits     Client Self Report: Doing well today, no pain. Feels that things overall have been fine.  Had a flare after last time, but better now, hoping to lighten weight, going on vacation, will return after that if needed.    Objective Measurements:                   Goals:  Goal Identifier DAVID   Goal Description Patient will improve DAVID by at least 10% to show improvement in function.   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   Target Date 23   Date Met      Progress (detail required for progress note):       Goal Identifier Bending   Goal Description Patient will demonstrate full bending ROM with no pain to return to PLOF and improve functional mobility.   Target Date 23   Date Met      Progress (detail required for progress note):       Goal Identifier     Goal Description     Target Date     Date Met      Progress (detail required for progress note):       Goal Identifier     Goal Description     Target Date     Date Met      Progress (detail required for progress note):       Goal Identifier     Goal Description     Target Date     Date Met      Progress (detail required for progress note):       Goal Identifier     Goal Description     Target Date     Date Met      Progress (detail required for progress note):       Goal Identifier     Goal Description     Target Date     Date Met      Progress (detail required for progress note):             Plan:  Discharge  from therapy.    Discharge: outpatient PT to HEP    Reason for Discharge: Patient chooses to discontinue therapy.      Discharge Plan: Patient to continue home program.

## 2023-02-16 NOTE — ADDENDUM NOTE
Encounter addended by: Yuni Myles, PT on: 2/16/2023 8:56 AM   Actions taken: Clinical Note Signed, Episode resolved

## 2023-03-09 ENCOUNTER — OFFICE VISIT (OUTPATIENT)
Dept: FAMILY MEDICINE | Facility: CLINIC | Age: 48
End: 2023-03-09
Payer: COMMERCIAL

## 2023-03-09 VITALS
BODY MASS INDEX: 22.6 KG/M2 | WEIGHT: 140.6 LBS | HEART RATE: 93 BPM | HEIGHT: 66 IN | SYSTOLIC BLOOD PRESSURE: 115 MMHG | TEMPERATURE: 97.9 F | DIASTOLIC BLOOD PRESSURE: 66 MMHG | OXYGEN SATURATION: 97 % | RESPIRATION RATE: 18 BRPM

## 2023-03-09 DIAGNOSIS — J06.9 VIRAL URI WITH COUGH: Primary | ICD-10-CM

## 2023-03-09 PROCEDURE — U0005 INFEC AGEN DETEC AMPLI PROBE: HCPCS | Performed by: NURSE PRACTITIONER

## 2023-03-09 PROCEDURE — 99213 OFFICE O/P EST LOW 20 MIN: CPT | Mod: CS | Performed by: NURSE PRACTITIONER

## 2023-03-09 PROCEDURE — U0003 INFECTIOUS AGENT DETECTION BY NUCLEIC ACID (DNA OR RNA); SEVERE ACUTE RESPIRATORY SYNDROME CORONAVIRUS 2 (SARS-COV-2) (CORONAVIRUS DISEASE [COVID-19]), AMPLIFIED PROBE TECHNIQUE, MAKING USE OF HIGH THROUGHPUT TECHNOLOGIES AS DESCRIBED BY CMS-2020-01-R: HCPCS | Performed by: NURSE PRACTITIONER

## 2023-03-09 RX ORDER — GUAIFENESIN/DEXTROMETHORPHAN 100-10MG/5
10 SYRUP ORAL EVERY 4 HOURS PRN
Qty: 236 ML | Refills: 0 | Status: SHIPPED | OUTPATIENT
Start: 2023-03-09

## 2023-03-09 NOTE — PATIENT INSTRUCTIONS
You are experiencing common virus symptoms. Viruses take 1-2 weeks to resolve on average.      Try cough medicine, honey, Tylenol as needed for discomfort.    Recheck if high fevers, shortness of breath or not better in about 2 weeks     COVID test.  We will call if positive only.      OK for work. Wear mask at work.

## 2023-03-09 NOTE — LETTER
[address] [address]  Phone: [phone]  Fax: [phone]    March 9, 2023        Francisco Desir  [address]  [place] [address]          To whom it may concern:    RE: Francisco Desir    Patient was seen and treated today at our clinic and missed work for 3 days.      Please contact me for questions or concerns.      Sincerely,        Ricarda Spain, CNP

## 2023-03-09 NOTE — PROGRESS NOTES
"Assessment & Plan     Viral URI with cough    - Symptomatic COVID-19 Virus (Coronavirus) by PCR Nose  - guaiFENesin-dextromethorphan (ROBITUSSIN DM) 100-10 MG/5ML syrup  Dispense: 236 mL; Refill: 0       Focused exam and history done due to COVID-19 pandemic in a walk-in setting.      History, exam, and vital signs consistent with a viral URI with exposure to what sounds like the same from child.    No red flags.     Isolate pending covid results.      Recheck if shortness of breath or new fevers develop.  Rest.     OTCs recommended: None [   ].  Dextromethorphan  [  ], guaifenesin [  ], pseudoephedrine [   ], Afrin for no greater than 3 days [  ], Tylenol or ibuprofen [ x ].                Return in about 2 weeks (around 3/23/2023) for If no better.    Ricarda Spain, [facility] AMRIT Singh is a 47 year old male who presents to clinic today for the following health issues:  Chief Complaint   Patient presents with     Cough     Has had a cough for the last couple days      Pharyngitis     Has had a sore throat for the last few days gets worse at night     HPI    Cough and ST for 2 days.  \"Cough makes my muscles get tired.\"  Using honey which helps a bit.      ST pain is 8/10.      Child with similar starting 2 days ago.  No strep/flu after being checked.  No covid test done.      Due to language barrier, an  was present during the history-taking and subsequent discussion (and for part of the physical exam) with this patient.        Review of Systems  See HPI      Objective    /66 (BP Location: Right arm, Patient Position: Sitting, Cuff Size: Adult Regular)   Pulse 93   Temp 97.9  F (36.6  C) (Oral)   Resp 18   Ht 1.676 m (5' 6\")   Wt 63.8 kg (140 lb 9.6 oz)   SpO2 97%   BMI 22.69 kg/m    Physical Exam  HENT:      Mouth/Throat:      Pharynx: Posterior oropharyngeal erythema present.      Tonsils: No tonsillar exudate. 1+ on the right. 1+ on the left. "   Pulmonary:      Effort: Pulmonary effort is normal.      Breath sounds: Normal breath sounds. No wheezing.   Lymphadenopathy:      Cervical: No cervical adenopathy.   Skin:     General: Skin is warm.   Psychiatric:         Mood and Affect: Mood normal.            No results found for this or any previous visit (from the past 24 hour(s)).

## 2023-03-10 LAB — SARS-COV-2 RNA RESP QL NAA+PROBE: POSITIVE

## 2023-03-11 ENCOUNTER — TELEPHONE (OUTPATIENT)
Dept: NURSING | Facility: CLINIC | Age: 48
End: 2023-03-11
Payer: COMMERCIAL

## 2023-03-11 NOTE — TELEPHONE ENCOUNTER
Patient classified as COVID treatment eligible by Epic high risk algorithm:  Yes    Coronavirus (COVID-19) Notification    Reason for call  Notify of POSITIVE COVID-19 lab result, assess symptoms,  review [facility] recommendations    Lab Result   Lab test for 2019-nCoV rRt-PCR or SARS-COV-2 PCR  Oropharyngeal AND/OR nasopharyngeal swabs were POSITIVE for 2019-nCoV RNA [OR] SARS-COV-2 RNA (COVID-19) RNA     We have been unable to reach patient by phone at this time to notify of their Positive COVID-19 result.    Left voicemail message requesting a call back to [phone] [facility] for results.        A Positive COVID-19 letter will be sent via [facility] or the mail.    Daphne Cespedes

## 2023-03-11 NOTE — TELEPHONE ENCOUNTER
Pt calling. He requests a [employer] . Called  services and got one on the call.  ID [id number]. Pt was seen in clinic Thurs and tested positive for COVID. Pt missed work Tues-Friday last week and is requesting his positive result be faxed to his employer:    [employer]  Fax# [phone] Attn: Caitlin Helton    Please contact pt with any questions or concerns @ [phone] please use [employer]  per pt request.     Marietta Palm, RN, BSN  [facility] Nurse Advisor 11:32 AM 3/11/2023

## 2023-03-11 NOTE — TELEPHONE ENCOUNTER
Pt calling. He request a [employer] . Called  services and got one on the call.  ID [id number]. Pt was seen in clinic Thurs and received a call with msg to call back.     COVID Positive/Requesting COVID treatment    Patient is positive for COVID and requesting treatment options.    Date of positive COVID test (PCR or at home)? 3/9  Current COVID symptoms: cough and sore throat  Date COVID symptoms began: 3/7    Message should be routed to clinic RN pool. Best phone number to use for call back: [phone] please use [employer]  per pt request.     Marietta Palm, RN, BSN  [facility] Nurse Advisor 11:28 AM 3/11/2023

## 2023-03-12 ENCOUNTER — VIRTUAL VISIT (OUTPATIENT)
Dept: URGENT CARE | Facility: CLINIC | Age: 48
End: 2023-03-12
Payer: COMMERCIAL

## 2023-03-12 DIAGNOSIS — U07.1 INFECTION DUE TO 2019 NOVEL CORONAVIRUS: Primary | ICD-10-CM

## 2023-03-12 PROCEDURE — 99442 PR PHYSICIAN TELEPHONE EVALUATION 11-20 MIN: CPT | Mod: CS

## 2023-03-12 NOTE — TELEPHONE ENCOUNTER
Patient is treated at [facility]. Patient will need to have a virtual visit with a provider to discuss Covid treatment.     Anant Lugo  ID [id number] assisting with call to patient.     Informed patient that a telephone visit with a provider will be needed to discuss Covid treatment options.   Patient states that he has a cough and sore throat and would like a prescription for medication.   Connected with scheduling for a phone visit today at 4:30 pm with virtual urgent care.   Micki Seaman RN   03/12/23 1:59 PM  [facility] Nurse Advisor

## 2023-03-12 NOTE — PROGRESS NOTES
"Francisco is a 47 year old who is being evaluated via a billable telephone visit.      Tested + for COVID Thursday.  Sx started on Tuesday 3/7.  Prescribed LON DM for cough 3/9.  COVID vaccinated and boosted.  Still with ST and cough.    What phone number would you like to be contacted at? cell  How would you like to obtain your AVS? MyChart    Distant Location (provider location):  Off-site    Assessment & Plan     Infection due to 2019 novel coronavirus    COVID-19 positive patient.  Encounter for consideration of medication intervention. Patient does not qualify for Paxlovid being day 6 since sx onset.    Continue with rest and fluids.  Tylenol/ibuprofen prn sx.  Close Follow-up if no change or new or worsening sx prn.    Estimated body mass index is 22.69 kg/m  as calculated from the following:    Height as of 3/9/23: 1.676 m (5' 6\").    Weight as of 3/9/23: 63.8 kg (140 lb 9.6 oz).  GFR Estimate   Date Value Ref Range Status   03/31/2021 >60 >60 mL/min/1.73m2 Final     Lab Results   Component Value Date    ORJNB52SDC Positive (A) 03/09/2023     No charge for [facility].    Jeanie Eckert MD  Virtual Urgent Care  [facility] VIRTUAL URGENT CARE    Subjective   Francisco is a 47 year old, presenting for the following health issues:  No chief complaint on file.      HPI     Tested + for COVID Thursday.  Sx started on Tuesday 3/7.  Prescribed LON DM for cough 3/9.  COVID vaccinated and boosted.  Still with ST and cough.      Review of Systems   Constitutional, HEENT, cardiovascular, pulmonary, GI, , musculoskeletal, neuro, skin, endocrine and psych systems are negative, except as otherwise noted.      Objective           Vitals:  No vitals were obtained today due to virtual visit.    Physical Exam   healthy, alert and no distress  PSYCH: Alert and oriented times 3; coherent speech, normal   rate and volume, able to articulate logical thoughts, able   to abstract reason, no tangential thoughts, no hallucinations   or " delusions  His affect is normal and pleasant  RESP: No cough, no audible wheezing, able to talk in full sentences  Remainder of exam unable to be completed due to telephone visits          Phone call duration:  15 Minutes  [employer] phone  used for entirety of visit.

## 2023-05-25 NOTE — NURSING NOTE
Due to patient being non-English speaking/uses sign language, an  was used for this visit. Only for face-to-face interpretation by an external agency, date and length of interpretation can be found on the scanned worksheet.      name: Jay  Agency: [employer]  Language: Harveyong   Telephone number: [phone]  Type of interpretation: Face-to-face, spoken     Madhavi Vigil       No

## 2023-08-10 ENCOUNTER — OFFICE VISIT (OUTPATIENT)
Dept: FAMILY MEDICINE | Facility: CLINIC | Age: 48
End: 2023-08-10
Payer: COMMERCIAL

## 2023-08-10 VITALS
SYSTOLIC BLOOD PRESSURE: 144 MMHG | DIASTOLIC BLOOD PRESSURE: 93 MMHG | HEART RATE: 84 BPM | OXYGEN SATURATION: 99 % | TEMPERATURE: 97.9 F

## 2023-08-10 DIAGNOSIS — H90.3 ASYMMETRICAL SENSORINEURAL HEARING LOSS: Primary | ICD-10-CM

## 2023-08-10 PROCEDURE — 99214 OFFICE O/P EST MOD 30 MIN: CPT | Performed by: NURSE PRACTITIONER

## 2023-08-10 ASSESSMENT — ENCOUNTER SYMPTOMS
COUGH: 0
CHILLS: 0
DIZZINESS: 0
FEVER: 0

## 2023-08-10 NOTE — PROGRESS NOTES
Assessment & Plan     Asymmetrical sensorineural hearing loss    - Adult ENT  Referral     Patient with what sounds like recent otitis externa treated with eardrops a month ago at PCP clinic, no records available for this.    Started hearing a whooshing sound in his ear around this time, muffled hearing.  Says he can hear, but it sounds muffled.  Visual ear exam is essentially normal today without pain nor obvious effusion.     Jackson and Rinne tests -localizes to the unaffected side and seems to have very good hearing with air conduction greater than bone conduction.  Possible sensorineural hearing loss.  It was gradually worsening, however, over the last 2 weeks.   Recommend calling tomorrow for appointment with [facility] as soon as possible and coming back sooner if hearing is worse.        22 minutes spent by me on the date of the encounter doing chart review, review of test results, patient visit, and documentation         No follow-ups on file.    Ricarda Spain [facility] YANNICKALEXSANDRA Singh is a 48 year old male who presents to clinic today for the following health issues:  Chief Complaint   Patient presents with    Urgent Care     Left ear pain x 2  weeks.     HPI    Hears wooshing sound in left year x 2 weeks.  No pain now.      Was treated for what sounds like OE by PCP with ear drops.      Hasn't had URI sx/allergy sx.      Feels like hearing is decreased.  Says is just muffled.  Gradually worsening.         Review of Systems   Constitutional:  Negative for chills and fever.   HENT:  Negative for congestion.    Respiratory:  Negative for cough.    Allergic/Immunologic: Negative for environmental allergies.   Neurological:  Negative for dizziness.               Objective    BP (!) 144/93 (BP Location: Right arm, Patient Position: Sitting)   Pulse 84   Temp 97.9  F (36.6  C) (Tympanic)   SpO2 99%   Physical Exam  Constitutional:       Appearance: Normal  appearance.   HENT:      Right Ear: Tympanic membrane normal. No drainage, swelling or tenderness. No middle ear effusion. There is no impacted cerumen.      Left Ear: Tympanic membrane normal. No drainage, swelling or tenderness.  No middle ear effusion. There is no impacted cerumen.      Ears:      Jackson exam findings: Lateralizes right.     Right Rinne: AC > BC.     Left Rinne: AC > BC.  Pulmonary:      Effort: Pulmonary effort is normal.   Skin:     General: Skin is warm.   Neurological:      Mental Status: He is alert.   Psychiatric:         Mood and Affect: Mood normal.

## 2023-08-10 NOTE — PATIENT INSTRUCTIONS
Call [facility] for appointment for decreased hearing over the last 2 weeks. Try to be seen next week.      Come back if pain or hearing is much worse.

## 2024-11-19 NOTE — PROGRESS NOTES
"           HPI:       Francisco Desir is a 42 year old  male without a significant past medical history who presents for follow up of concern(s) listed below    1. Cough: He has had a cough for 17 days, was seen on 5-3-17, was given cough medication for presumed viral etiology for cough, the cough medicine did not seem helpful, he has no shortness of breath, no chest pains, no fever, he does have a runny nose, it is a dry cough, no watery eye or itchy eyes (but has been treated for allergic conjunctivitis in the past), cough is worse at night, keeps him awake, no sick contacts, works packaging food, wears a mask at work    A [employer]  was used for this visit         PMHX:     There is no problem list on file for this patient.      Current Outpatient Prescriptions   Medication Sig Dispense Refill     loratadine-pseudoePHEDrine (CLARITIN-D 24-HOUR)  MG per 24 hr tablet Take 1 tablet by mouth daily 14 tablet 0     guaiFENesin-codeine (ROBITUSSIN AC) 100-10 MG/5ML SOLN solution Take 10 mLs by mouth nightly as needed for cough 240 mL 0     senna-docusate (SENOKOT-S;PERICOLACE) 8.6-50 MG per tablet Take 1 tablet by mouth Reported on 5/3/2017       hydrocortisone (CORTAID) 1 % cream Apply sparingly to affected area twice per day as needed (Patient not taking: Reported on 4/10/2017) 30 g 0          No Known Allergies    No results found for this or any previous visit (from the past 24 hour(s)).           Physical Exam:     Vitals:    05/18/17 1540   BP: 117/72   BP Location: Right arm   Patient Position: Chair   Cuff Size: Adult Regular   Pulse: 78   Resp: 20   Temp: 98.5  F (36.9  C)   TempSrc: Oral   SpO2: 99%   Weight: 133 lb 12.8 oz (60.7 kg)   Height: 5' 3.5\" (161.3 cm)     Body mass index is 23.33 kg/(m^2).    GENERAL APPEARANCE: healthy, alert and no distress,  EYES: left eye: conjunctiva is injected  HENT: ear canals and TM's normal, nose with enlarged turbinates, mouth without ulcers or lesions, post " nasal drip noted  RESP: lungs clear to auscultation - no rales, rhonchi or wheezes  CV: regular rate and rhythm,  and no murmur, click,  rub or gallop      Assessment and Plan     1. Cough  - Continue with symptomatic cares, Xray was not obtained today due to stable vitals, normal respiratory effort  - guaiFENesin-codeine (ROBITUSSIN AC) 100-10 MG/5ML SOLN solution; Take 10 mLs by mouth nightly as needed for cough  Dispense: 240 mL; Refill: 0  - loratadine-pseudoePHEDrine (CLARITIN-D 24-HOUR)  MG per 24 hr tablet; Take 1 tablet by mouth daily  Dispense: 14 tablet; Refill: 0    2. Viral URI with cough  - will try above medication regimen, if still symptomatic in 1 week, return to clinic for CXR    Options for treatment and follow-up care were reviewed with the patient and/or guardian. Francisco Desir and/or guardian engaged in the decision making process and verbalized understanding of the options discussed and agreed with the final plan.    Nicole Roth, DO           electronic